# Patient Record
Sex: MALE | Race: ASIAN | NOT HISPANIC OR LATINO | Employment: UNEMPLOYED | URBAN - METROPOLITAN AREA
[De-identification: names, ages, dates, MRNs, and addresses within clinical notes are randomized per-mention and may not be internally consistent; named-entity substitution may affect disease eponyms.]

---

## 2024-01-18 ENCOUNTER — TELEPHONE (OUTPATIENT)
Dept: FAMILY MEDICINE | Facility: CLINIC | Age: 44
End: 2024-01-18
Payer: COMMERCIAL

## 2024-01-19 ENCOUNTER — HOSPITAL ENCOUNTER (OUTPATIENT)
Dept: RADIOLOGY | Facility: HOSPITAL | Age: 44
Discharge: HOME | End: 2024-01-19
Attending: INTERNAL MEDICINE
Payer: COMMERCIAL

## 2024-01-19 ENCOUNTER — OFFICE VISIT (OUTPATIENT)
Dept: FAMILY MEDICINE | Facility: CLINIC | Age: 44
End: 2024-01-19
Payer: COMMERCIAL

## 2024-01-19 ENCOUNTER — HOSPITAL ENCOUNTER (EMERGENCY)
Facility: HOSPITAL | Age: 44
Discharge: HOME | End: 2024-01-19
Attending: STUDENT IN AN ORGANIZED HEALTH CARE EDUCATION/TRAINING PROGRAM
Payer: COMMERCIAL

## 2024-01-19 VITALS
BODY MASS INDEX: 23.07 KG/M2 | DIASTOLIC BLOOD PRESSURE: 69 MMHG | WEIGHT: 147 LBS | HEART RATE: 83 BPM | TEMPERATURE: 98 F | OXYGEN SATURATION: 98 % | HEIGHT: 67 IN | SYSTOLIC BLOOD PRESSURE: 114 MMHG | RESPIRATION RATE: 16 BRPM

## 2024-01-19 VITALS
HEART RATE: 76 BPM | DIASTOLIC BLOOD PRESSURE: 82 MMHG | SYSTOLIC BLOOD PRESSURE: 110 MMHG | OXYGEN SATURATION: 99 % | WEIGHT: 147.69 LBS | BODY MASS INDEX: 23.13 KG/M2 | TEMPERATURE: 98 F

## 2024-01-19 DIAGNOSIS — F17.200 CURRENT EVERY DAY SMOKER: ICD-10-CM

## 2024-01-19 DIAGNOSIS — Z23 NEED FOR PNEUMOCOCCAL VACCINE: ICD-10-CM

## 2024-01-19 DIAGNOSIS — R51.9 CHRONIC LEFT-SIDED HEADACHE: ICD-10-CM

## 2024-01-19 DIAGNOSIS — Z00.00 ANNUAL PHYSICAL EXAM: Primary | ICD-10-CM

## 2024-01-19 DIAGNOSIS — F31.9 BIPOLAR 1 DISORDER: ICD-10-CM

## 2024-01-19 DIAGNOSIS — Z23 INFLUENZA VACCINE NEEDED: ICD-10-CM

## 2024-01-19 DIAGNOSIS — G89.29 CHRONIC LEFT-SIDED HEADACHE: ICD-10-CM

## 2024-01-19 DIAGNOSIS — G93.89 BRAIN MASS: Primary | ICD-10-CM

## 2024-01-19 DIAGNOSIS — Z86.19 HISTORY OF HEPATITIS B: ICD-10-CM

## 2024-01-19 DIAGNOSIS — Z12.11 COLON CANCER SCREENING: ICD-10-CM

## 2024-01-19 PROBLEM — B19.10 HEPATITIS B: Status: ACTIVE | Noted: 2024-01-19

## 2024-01-19 PROBLEM — B19.10 HEPATITIS B: Status: RESOLVED | Noted: 2024-01-19 | Resolved: 2024-01-19

## 2024-01-19 LAB
ALBUMIN SERPL BCP-MCNC: 4.1 G/DL (ref 3.5–5.2)
ALP SERPL-CCNC: 63 U/L (ref 55–135)
ALT SERPL W/O P-5'-P-CCNC: 13 U/L (ref 10–44)
ANION GAP SERPL CALC-SCNC: 8 MMOL/L (ref 8–16)
AST SERPL-CCNC: 13 U/L (ref 10–40)
BASOPHILS # BLD AUTO: 0.07 K/UL (ref 0–0.2)
BASOPHILS NFR BLD: 0.9 % (ref 0–1.9)
BILIRUB SERPL-MCNC: 0.5 MG/DL (ref 0.1–1)
BUN SERPL-MCNC: 9 MG/DL (ref 6–20)
CALCIUM SERPL-MCNC: 9.2 MG/DL (ref 8.7–10.5)
CHLORIDE SERPL-SCNC: 104 MMOL/L (ref 95–110)
CO2 SERPL-SCNC: 29 MMOL/L (ref 23–29)
CREAT SERPL-MCNC: 1 MG/DL (ref 0.5–1.4)
DIFFERENTIAL METHOD BLD: ABNORMAL
EOSINOPHIL # BLD AUTO: 0.7 K/UL (ref 0–0.5)
EOSINOPHIL NFR BLD: 9.3 % (ref 0–8)
ERYTHROCYTE [DISTWIDTH] IN BLOOD BY AUTOMATED COUNT: 11.5 % (ref 11.5–14.5)
EST. GFR  (NO RACE VARIABLE): >60 ML/MIN/1.73 M^2
GLUCOSE SERPL-MCNC: 81 MG/DL (ref 70–110)
HCT VFR BLD AUTO: 41.5 % (ref 40–54)
HGB BLD-MCNC: 14 G/DL (ref 14–18)
IMM GRANULOCYTES # BLD AUTO: 0.02 K/UL (ref 0–0.04)
IMM GRANULOCYTES NFR BLD AUTO: 0.3 % (ref 0–0.5)
LYMPHOCYTES # BLD AUTO: 2.3 K/UL (ref 1–4.8)
LYMPHOCYTES NFR BLD: 29.8 % (ref 18–48)
MCH RBC QN AUTO: 30.8 PG (ref 27–31)
MCHC RBC AUTO-ENTMCNC: 33.7 G/DL (ref 32–36)
MCV RBC AUTO: 91 FL (ref 82–98)
MONOCYTES # BLD AUTO: 0.7 K/UL (ref 0.3–1)
MONOCYTES NFR BLD: 9.3 % (ref 4–15)
NEUTROPHILS # BLD AUTO: 3.9 K/UL (ref 1.8–7.7)
NEUTROPHILS NFR BLD: 50.4 % (ref 38–73)
NRBC BLD-RTO: 0 /100 WBC
PLATELET # BLD AUTO: 197 K/UL (ref 150–450)
PMV BLD AUTO: 10.6 FL (ref 9.2–12.9)
POTASSIUM SERPL-SCNC: 3.7 MMOL/L (ref 3.5–5.1)
PROT SERPL-MCNC: 7.5 G/DL (ref 6–8.4)
RBC # BLD AUTO: 4.54 M/UL (ref 4.6–6.2)
SODIUM SERPL-SCNC: 141 MMOL/L (ref 136–145)
WBC # BLD AUTO: 7.66 K/UL (ref 3.9–12.7)

## 2024-01-19 PROCEDURE — A9585 GADOBUTROL INJECTION: HCPCS | Performed by: STUDENT IN AN ORGANIZED HEALTH CARE EDUCATION/TRAINING PROGRAM

## 2024-01-19 PROCEDURE — 90677 PCV20 VACCINE IM: CPT | Mod: S$GLB,,, | Performed by: INTERNAL MEDICINE

## 2024-01-19 PROCEDURE — 70450 CT HEAD/BRAIN W/O DYE: CPT | Mod: TC

## 2024-01-19 PROCEDURE — 90472 IMMUNIZATION ADMIN EACH ADD: CPT | Mod: S$GLB,,, | Performed by: INTERNAL MEDICINE

## 2024-01-19 PROCEDURE — 1160F RVW MEDS BY RX/DR IN RCRD: CPT | Mod: CPTII,S$GLB,, | Performed by: INTERNAL MEDICINE

## 2024-01-19 PROCEDURE — 99285 EMERGENCY DEPT VISIT HI MDM: CPT | Mod: 25

## 2024-01-19 PROCEDURE — 3074F SYST BP LT 130 MM HG: CPT | Mod: CPTII,S$GLB,, | Performed by: INTERNAL MEDICINE

## 2024-01-19 PROCEDURE — 90686 IIV4 VACC NO PRSV 0.5 ML IM: CPT | Mod: S$GLB,,, | Performed by: INTERNAL MEDICINE

## 2024-01-19 PROCEDURE — 25500020 PHARM REV CODE 255: Performed by: STUDENT IN AN ORGANIZED HEALTH CARE EDUCATION/TRAINING PROGRAM

## 2024-01-19 PROCEDURE — 70450 CT HEAD/BRAIN W/O DYE: CPT | Mod: 26,,, | Performed by: RADIOLOGY

## 2024-01-19 PROCEDURE — 99204 OFFICE O/P NEW MOD 45 MIN: CPT | Mod: 25,S$GLB,, | Performed by: INTERNAL MEDICINE

## 2024-01-19 PROCEDURE — 3079F DIAST BP 80-89 MM HG: CPT | Mod: CPTII,S$GLB,, | Performed by: INTERNAL MEDICINE

## 2024-01-19 PROCEDURE — 99999 PR PBB SHADOW E&M-EST. PATIENT-LVL III: CPT | Mod: PBBFAC,,, | Performed by: INTERNAL MEDICINE

## 2024-01-19 PROCEDURE — 90471 IMMUNIZATION ADMIN: CPT | Mod: S$GLB,,, | Performed by: INTERNAL MEDICINE

## 2024-01-19 PROCEDURE — 3008F BODY MASS INDEX DOCD: CPT | Mod: CPTII,S$GLB,, | Performed by: INTERNAL MEDICINE

## 2024-01-19 PROCEDURE — 1159F MED LIST DOCD IN RCRD: CPT | Mod: CPTII,S$GLB,, | Performed by: INTERNAL MEDICINE

## 2024-01-19 PROCEDURE — 80053 COMPREHEN METABOLIC PANEL: CPT | Performed by: STUDENT IN AN ORGANIZED HEALTH CARE EDUCATION/TRAINING PROGRAM

## 2024-01-19 PROCEDURE — 85025 COMPLETE CBC W/AUTO DIFF WBC: CPT | Performed by: STUDENT IN AN ORGANIZED HEALTH CARE EDUCATION/TRAINING PROGRAM

## 2024-01-19 RX ORDER — VARENICLINE TARTRATE 0.5 (11)-1
KIT ORAL
Qty: 1 EACH | Refills: 0 | Status: SHIPPED | OUTPATIENT
Start: 2024-01-19 | End: 2024-04-19

## 2024-01-19 RX ORDER — TOPIRAMATE 25 MG/1
25 TABLET ORAL DAILY
Qty: 60 TABLET | Refills: 0 | Status: SHIPPED | OUTPATIENT
Start: 2024-01-19 | End: 2024-04-15 | Stop reason: CLARIF

## 2024-01-19 RX ORDER — GADOBUTROL 604.72 MG/ML
6 INJECTION INTRAVENOUS
Status: COMPLETED | OUTPATIENT
Start: 2024-01-19 | End: 2024-01-19

## 2024-01-19 RX ORDER — OSELTAMIVIR PHOSPHATE 75 MG/1
75 CAPSULE ORAL 2 TIMES DAILY
Qty: 10 CAPSULE | Refills: 0 | Status: SHIPPED | OUTPATIENT
Start: 2024-01-19 | End: 2024-01-19 | Stop reason: SDUPTHER

## 2024-01-19 RX ORDER — OSELTAMIVIR PHOSPHATE 75 MG/1
75 CAPSULE ORAL
Status: DISCONTINUED | OUTPATIENT
Start: 2024-01-19 | End: 2024-01-19

## 2024-01-19 RX ADMIN — IOHEXOL 80 ML: 350 INJECTION, SOLUTION INTRAVENOUS at 07:01

## 2024-01-19 RX ADMIN — GADOBUTROL 6 ML: 604.72 INJECTION INTRAVENOUS at 05:01

## 2024-01-19 NOTE — Clinical Note
"Teto Haddadsebastian Chong was seen and treated in our emergency department on 1/19/2024.  He may return to work on 01/21/2024.       If you have any questions or concerns, please don't hesitate to call.      Alberto Sommer MD"

## 2024-01-19 NOTE — ED TRIAGE NOTES
Pt reports being seen at an [facility] for HA.  He had CT scan done today which was abnormal and he was told to come to ED for further evaluation.  He reports chronic HA to left side and takes advil to decrease pain.  He reports daily advil for years.  Denies medical hx.  Allergic to bees.

## 2024-01-19 NOTE — ED PROVIDER NOTES
Encounter Date: 1/19/2024       History     Chief Complaint   Patient presents with    Headache     Patient reports was having CT sent to ED secondary to abnormal reading, States having ongoing left HA without significant vision issues, denies any deficits.      43-year-old male presents with a headache that has been present for approximately 1-2 years found have an abnormal CT scan finding an outpatient Radiology.  Patient reported that he had left-sided headache throughout this time and it was his new year's resolution to have it checked out.  He saw his primary care physician who recommended a CT of the brain.  The CT scan was performed and the patient was emergently sent to the emergency department.  He denies any nausea, vomiting, dysuria, dysphagia, dysarthria, numbness he low unilateral weakness.  He denies any trauma or falls.  He says he feels had his baseline outside of the headache.      Review of patient's allergies indicates:  No Known Allergies  Past Medical History:   Diagnosis Date    Bipolar 1 disorder     Hepatitis B      History reviewed. No pertinent surgical history.  Family History   Problem Relation Age of Onset    Hypertension Mother     Stomach cancer Father      Social History     Tobacco Use    Smoking status: Every Day     Current packs/day: 0.50     Types: Cigarettes   Substance Use Topics    Alcohol use: Yes    Drug use: No     Review of Systems    Physical Exam     Initial Vitals [01/19/24 1434]   BP Pulse Resp Temp SpO2   131/69 106 18 98.2 °F (36.8 °C) 96 %      MAP       --         Physical Exam    Nursing note and vitals reviewed.  Constitutional: He appears well-developed and well-nourished.   HENT:   Head: Normocephalic and atraumatic.   Eyes: EOM are normal. Pupils are equal, round, and reactive to light.   Neck: Neck supple. No JVD present.   Normal range of motion.  Cardiovascular:  Normal rate and regular rhythm.           Pulmonary/Chest: Breath sounds normal. No stridor. No  respiratory distress.   Abdominal: Abdomen is soft. He exhibits no distension. There is no abdominal tenderness. There is no rebound and no guarding.   Musculoskeletal:         General: No tenderness or edema. Normal range of motion.      Cervical back: Normal range of motion and neck supple.     Neurological: He is alert and oriented to person, place, and time. GCS score is 15. GCS eye subscore is 4. GCS verbal subscore is 5. GCS motor subscore is 6.   Skin: Skin is warm and dry. Capillary refill takes less than 2 seconds.   Psychiatric: He has a normal mood and affect. Thought content normal.         ED Course   Procedures  Labs Reviewed   CBC W/ AUTO DIFFERENTIAL - Abnormal; Notable for the following components:       Result Value    RBC 4.54 (*)     Eos # 0.7 (*)     Eosinophil % 9.3 (*)     All other components within normal limits   COMPREHENSIVE METABOLIC PANEL          Imaging Results              CTA Brain (Final result)  Result time 01/19/24 20:36:09      Final result by Seun Bales MD (01/19/24 20:36:09)                   Impression:      No acute abnormality.    No high-grade stenosis or major vessel occlusion.    No evidence of AVM.    No abnormal enhancement in the mass lesions seen in the right occipital lobe near the occipital horn of the lateral ventricle.  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      Electronically signed by: Seun Bales  Date:    01/19/2024  Time:    20:36               Narrative:    EXAMINATION:  CTA HEAD    CLINICAL HISTORY:  possible avm;    TECHNIQUE:  Non contrast low dose axial images were obtained though the head. CT angiogram was performed from the level of the bottom of C2 to the top of the head following the IV administration of 100 mL of Omnipaque 350.   Sagittal and coronal reconstructions and maximum intensity projection reconstructions were performed.    COMPARISON:  None    FINDINGS:  Intracranial Compartment:    Ventricles and  sulci are stable in size for age without evidence of hydrocephalus. No extra-axial blood or fluid collections.    The brain parenchyma appears stable.  No parenchymal mass, hemorrhage, edema, or major vascular distribution infarct.    The high density mass seen on CT and MRI near the occipital horn of the lateral ventricle in the para sagittal right occipital cortex is again noted unchanged.    Skull/Extracranial Contents (limited evaluation): Stable    Intracranial Arteries: No focal high-grade stenosis, occlusion, or aneurysm.  Specifically, no evidence of abnormal enhancement of the mass in the right occipital lobe.  No evidence of AVM.    Venous structures (limited evaluation): Normal.    The cervical circulation and aortic arch appear unremarkable.  No common carotid or internal carotid stenosis.  Co dominant vertebral artery flow.  Visualized visceral spaces unremarkable as imaged.  Lung apices clear is visualized.                                        MRI Brain W WO Contrast (Final result)  Result time 01/19/24 18:40:55      Final result by Geronimo Ovalles MD (01/19/24 18:40:55)                   Impression:      2.4 x 1.6 cm space-occupying lesion in the right medial occipital lobe with subacute blood products and extension into the occipital horn of the right lateral ventricle.  No evidence of hydrocephalus.  No appreciable enhancement.  CTA of the brain is suggested for the evaluation of possible underlying vascular lesion.  Follow-up with neuro surgery is also suggested.    This report was flagged in Epic as abnormal.      Electronically signed by: Geronimo Ovalles MD  Date:    01/19/2024  Time:    18:40               Narrative:    EXAMINATION:  MRI BRAIN W WO CONTRAST    CLINICAL HISTORY:  brain lesion;    TECHNIQUE:  Multiplanar multisequence MR imaging of the brain was performed before and after the administration of 6 mL Gadavist intravenous contrast.    COMPARISON:  CT head dated  01/19/2024.    FINDINGS:  The craniocervical junction is intact.  The sellar and parasellar structures are within normal limits.  The intracranial flow voids are within normal limits.    There is a 1.6 x 1.4 cm focus of diffusion restriction in the occipital horn of the right lateral ventricle.    There is also focus of diffusion restriction within the posterior aspect of the previously identified mass in the right occipital lobe.  There is associated gradient echo susceptibility artifact along the periphery of the diffusion restriction in the right occipital horn.    There is also gradient echo susceptibility artifact in the lesion in the right medial occipital lobe.  The lesion measures approximately 2.4 x 1.6 cm.  The lesion demonstrates T1 hyperintensity with corresponding decreased signal on the T2 sequences.  There is a localized mass effect with no appreciable midline shift.    The remainder of the sulci are within normal limits.  There are no extra-axial fluid collections.    With regards to the right occipital lobe lesion, no appreciable enhancement is identified, allowing for the intrinsic T1 hyperintense material.    No additional abnormal enhancement identified within the brain.    The orbits and intraorbital contents are within normal limits.  The paranasal sinuses are within normal limits.  The mastoid air cells are clear.  The calvarium is intact.                                       Medications   gadobutroL (GADAVIST) injection 6 mL (6 mLs Intravenous Given 1/19/24 1733)   iohexoL (OMNIPAQUE 350) injection 80 mL (80 mLs Intravenous Given 1/19/24 1918)     Medical Decision Making  Hemodynamically stable. Afebrile. Phonating and protecting the airway spontaneously. No clinical evidence for cardiovascular instability or impending airway compromise. Examination as above. \ Prior medical records reviewed. PMD note reviewed, pt established care with a PCP for well check. Reportedly had hallucinations.  Current co-morbidities considered that will impact clinical decision making include as above.    Plan:  CBC, CMP, MRI brain.       Amount and/or Complexity of Data Reviewed  Labs: ordered.  Radiology: ordered.    Risk  Prescription drug management.               ED Course as of 01/19/24 2044 Fri Jan 19, 2024 1904 Spoke with Neurosurgery regarding MRI results.  Recommended CT angiogram of the brain to assess for vascular lesion and degree of bleeding.  If degree of bleeding is unchanged, patient can be discharged home with vascular Neurology follow-up.  If however bleeding has worsened, recommended transfer to main [place]. [BG]   2043 CTA reviewed. Will give NSGY referral.  [BG]      ED Course User Index  [BG] Alberto Sommer MD                           Clinical Impression:  Final diagnoses:  [G93.89] Brain mass (Primary)                 Alberto Sommer MD  01/19/24 2044

## 2024-01-19 NOTE — ASSESSMENT & PLAN NOTE
Chronic. Refer to HPI.     - advised patient to follow up with his previous psychiatrist. Will refer to psychiatry if he hasn't followed up with them in 3 months

## 2024-01-19 NOTE — ASSESSMENT & PLAN NOTE
About 13 pack year history    - will start chantix. Counseled about SE  - follow up in three months

## 2024-01-19 NOTE — PROGRESS NOTES
Health Maintenance Due   Topic     Hepatitis C Screening  Consult pcp    Lipid Panel  Consult pcp    COVID-19 Vaccine (1) Not offered at this office    Pneumococcal Vaccines (Age 0-64) (1 - PCV) Pt agree to get today    HIV Screening  Consult pcp    TETANUS VACCINE      Colorectal Cancer Screening  Consult pcp    Influenza Vaccine (1) Pt agree to get today

## 2024-01-19 NOTE — ASSESSMENT & PLAN NOTE
Chronic, daily, mild-mod. Likely tension?   Reports good sleep. Has stress from work     - rule out intracranial lesion with CT head  - will start topiramate 25mg daily, can increase dose   - advised patient to get vision checked

## 2024-01-19 NOTE — PROGRESS NOTES
Chief Complaint: Annual Exam (Headaches on left side of head for years and pt has been taking advil to help with pain) and Establish Care      Teto Chong  is a 43 y.o. year old patient who presents to clinic today to establish as a new patient.     Daily headaches, takes advil everyday, sometimes more than once. Left sided. Tylenol does not work. 3-4/10 right now, sometimes 7-8/10. Going on for more than a year. No weakness or loss of sensation.     Has history of bipolar 1. Was following with psych but felt drowsy from meds so stopped following up. Reports that he sometimes feels people are following him and sometimes hears voices calling his name.     Patient also has history of Hep B, reports that he was treated in the past.     Social hx: Patient does smoke cigarettes or vape. Started when 17 yo, 0.5 ppd, around 13 pack year.     History reviewed. No pertinent surgical history.     Family History   Problem Relation Age of Onset    Hypertension Mother     Stomach cancer Father         Social History     Socioeconomic History    Marital status: Single   Tobacco Use    Smoking status: Every Day     Current packs/day: 0.50     Types: Cigarettes   Substance and Sexual Activity    Alcohol use: Yes    Drug use: No         Current Outpatient Medications:     topiramate (TOPAMAX) 25 MG tablet, Take 1 tablet (25 mg total) by mouth once daily., Disp: 60 tablet, Rfl: 0    varenicline (CHANTIX STARTING MONTH BOX) 0.5 mg (11)- 1 mg (42) tablet, Take one 0.5mg tab by mouth once daily X3 days,then increase to one 0.5mg tab twice daily X4 days,then increase to one 1mg tab twice daily, Disp: 1 each, Rfl: 0     Review of Systems   Eyes:  Negative for blurred vision.   Respiratory:  Negative for cough and shortness of breath.    Cardiovascular:  Negative for chest pain.   Gastrointestinal:  Negative for abdominal pain, constipation, diarrhea and nausea.   Neurological:  Positive for headaches. Negative for dizziness,  sensory change, focal weakness, loss of consciousness and weakness.   Psychiatric/Behavioral:  Positive for hallucinations (auditory?). Negative for suicidal ideas.         Objective:      Vitals:    01/19/24 1049   BP: 110/82   Pulse: 76   Temp: 98.2 °F (36.8 °C)       Physical Exam  Vitals and nursing note reviewed.   Constitutional:       Appearance: Normal appearance.   HENT:      Head: Normocephalic and atraumatic.   Cardiovascular:      Rate and Rhythm: Normal rate and regular rhythm.   Pulmonary:      Effort: Pulmonary effort is normal.      Breath sounds: Normal breath sounds. No wheezing or rales.   Abdominal:      General: Bowel sounds are normal.      Palpations: Abdomen is soft.      Tenderness: There is no abdominal tenderness.   Musculoskeletal:      Right lower leg: No edema.      Left lower leg: No edema.   Skin:     General: Skin is warm and dry.   Neurological:      General: No focal deficit present.      Mental Status: He is alert and oriented to person, place, and time.      Sensory: No sensory deficit.      Motor: No weakness.      Gait: Gait normal.   Psychiatric:         Mood and Affect: Mood normal.         Behavior: Behavior normal.          Assessment:       1. Annual physical exam    2. Influenza vaccine needed    3. Need for pneumococcal vaccine    4. Current every day smoker    5. Chronic left-sided headache    6. Colon cancer screening    7. History of hepatitis B    8. Bipolar 1 disorder          Plan:   1. Annual physical exam  Assessment & Plan:  - cologuard   - pneumonia and flu vaccine   - annual labs   - Reviewed medical, surgical, family and social history.   - counseled on making appointment for yearly dental and eye check up       Orders:  -     CBC Auto Differential; Future; Expected date: 01/19/2024  -     Comprehensive Metabolic Panel; Future; Expected date: 01/19/2024  -     Hemoglobin A1C; Future; Expected date: 01/19/2024  -     HIV 1/2 Ag/Ab (4th Gen); Future; Expected  date: 01/19/2024  -     Hepatitis C Antibody; Future; Expected date: 01/19/2024  -     Lipid Panel; Future; Expected date: 01/19/2024    2. Influenza vaccine needed  -     Influenza - Quadrivalent *Preferred* (6 months+) (PF)    3. Need for pneumococcal vaccine  -     Pneumococcal Conjugate Vaccine (20 Valent) (IM)(Preferred)    4. Current every day smoker  Assessment & Plan:  About 13 pack year history    - will start chantix. Counseled about SE  - follow up in three months     Orders:  -     varenicline (CHANTIX STARTING MONTH BOX) 0.5 mg (11)- 1 mg (42) tablet; Take one 0.5mg tab by mouth once daily X3 days,then increase to one 0.5mg tab twice daily X4 days,then increase to one 1mg tab twice daily  Dispense: 1 each; Refill: 0    5. Chronic left-sided headache  Assessment & Plan:  Chronic, daily, mild-mod. Likely tension?   Reports good sleep. Has stress from work     - rule out intracranial lesion with CT head  - will start topiramate 25mg daily, can increase dose   - advised patient to get vision checked    Orders:  -     CT Head Without Contrast; Future; Expected date: 01/19/2024  -     topiramate (TOPAMAX) 25 MG tablet; Take 1 tablet (25 mg total) by mouth once daily.  Dispense: 60 tablet; Refill: 0    6. Colon cancer screening  -     Cologuard Screening (Multitarget Stool DNA); Future; Expected date: 01/19/2024    7. History of hepatitis B  Assessment & Plan:  Reports he was treated in the past     - hepatitis panel    Orders:  -     HEPATITIS PANEL, ACUTE; Future; Expected date: 01/19/2024    8. Bipolar 1 disorder  Assessment & Plan:  Chronic. Refer to HPI.     - advised patient to follow up with his previous psychiatrist. Will refer to psychiatry if he hasn't followed up with them in 3 months            Follow up in about 3 months (around 4/19/2024) for follow up smoking and headaches.

## 2024-01-19 NOTE — ASSESSMENT & PLAN NOTE
- cologuard   - pneumonia and flu vaccine   - annual labs   - Reviewed medical, surgical, family and social history.   - counseled on making appointment for yearly dental and eye check up

## 2024-01-19 NOTE — DISCHARGE INSTRUCTIONS

## 2024-01-22 ENCOUNTER — TELEPHONE (OUTPATIENT)
Dept: NEUROLOGY | Facility: CLINIC | Age: 44
End: 2024-01-22

## 2024-01-22 ENCOUNTER — TELEPHONE (OUTPATIENT)
Dept: NEUROSURGERY | Facility: CLINIC | Age: 44
End: 2024-01-22
Payer: COMMERCIAL

## 2024-01-22 ENCOUNTER — PATIENT MESSAGE (OUTPATIENT)
Dept: NEUROLOGY | Facility: CLINIC | Age: 44
End: 2024-01-22
Payer: COMMERCIAL

## 2024-01-22 ENCOUNTER — TELEPHONE (OUTPATIENT)
Dept: FAMILY MEDICINE | Facility: CLINIC | Age: 44
End: 2024-01-22
Payer: COMMERCIAL

## 2024-01-22 DIAGNOSIS — B18.1 CHRONIC HEPATITIS B: ICD-10-CM

## 2024-01-22 DIAGNOSIS — Z86.19 HISTORY OF HEPATITIS B: Primary | ICD-10-CM

## 2024-01-22 NOTE — TELEPHONE ENCOUNTER
----- Message from Dipika Perales RN sent at 1/22/2024  3:06 PM CST -----  Regarding: FW: Appointment  Contact: [phone]  Would this be a patient for Reny?  ED notes says vascular neurology follow up.  Looks like patient self scheduled with [facility].   ----- Message -----  From: Kimberly Camp  Sent: 1/22/2024   2:57 PM CST  To: [facility] Staff  Subject: Appointment                                      Calling to schedule an appointment per abnormal MRI as soon as possible. Please call patient to schedule today.

## 2024-01-22 NOTE — TELEPHONE ENCOUNTER
----- Message from Angie Null sent at 1/22/2024  8:32 AM CST -----  Regarding: Patient call back  .Type: Patient Call Back    Who called:self     What is the request in detail:states the test that was recommend has been completed     Can the clinic reply by [facility]?no     Would the patient rather a call back or a response via [facility]? Call     Best call back [phone] call around 12 pm noon if needed, or leave a message       Additional Information:

## 2024-01-22 NOTE — TELEPHONE ENCOUNTER
Left a message for the patient to let him know his appointment on 1/26 is scheduled incorreclty. The patient was provided a phone number to contact Neurosurgery to schedule an appointment.

## 2024-01-23 ENCOUNTER — TELEPHONE (OUTPATIENT)
Dept: FAMILY MEDICINE | Facility: CLINIC | Age: 44
End: 2024-01-23
Payer: COMMERCIAL

## 2024-01-23 NOTE — TELEPHONE ENCOUNTER
LVM advising patient to check his [facility] message. If the call is return please have pt to login for his results and to schedule ordered test.

## 2024-01-23 NOTE — TELEPHONE ENCOUNTER
----- Message from Mariah Negrete sent at 1/23/2024 12:14 PM CST -----  .Type:  Patient Returning Call    Who Called: Self     Who Left Message for Patient: Rosanne     Does the patient know what this is regarding?: Yes     Would the patient rather a call back or a response via [facility]? Call Back     Best Call Back Number: .[phone] (home)       Additional Information:

## 2024-01-23 NOTE — TELEPHONE ENCOUNTER
----- Message from Ana Rosa Lind MD sent at 1/22/2024  1:35 PM CST -----  Please call the patient and let him know that he tested positive for Hep B (chronic). He will need an ultrasound of the liver and HBV DNA level. The rest of his lab work was normal including the liver enzymes. Thank you

## 2024-02-06 ENCOUNTER — OFFICE VISIT (OUTPATIENT)
Dept: NEUROSURGERY | Facility: CLINIC | Age: 44
End: 2024-02-06
Payer: COMMERCIAL

## 2024-02-06 DIAGNOSIS — G93.89 BRAIN MASS: ICD-10-CM

## 2024-02-06 PROCEDURE — 1159F MED LIST DOCD IN RCRD: CPT | Mod: CPTII,S$GLB,, | Performed by: NEUROLOGICAL SURGERY

## 2024-02-06 PROCEDURE — 3044F HG A1C LEVEL LT 7.0%: CPT | Mod: CPTII,S$GLB,, | Performed by: NEUROLOGICAL SURGERY

## 2024-02-06 PROCEDURE — 99999 PR PBB SHADOW E&M-EST. PATIENT-LVL III: CPT | Mod: PBBFAC,,, | Performed by: NEUROLOGICAL SURGERY

## 2024-02-06 PROCEDURE — 99204 OFFICE O/P NEW MOD 45 MIN: CPT | Mod: S$GLB,,, | Performed by: NEUROLOGICAL SURGERY

## 2024-02-06 RX ORDER — IBUPROFEN 200 MG
200 TABLET ORAL EVERY 6 HOURS PRN
COMMUNITY
End: 2024-04-19

## 2024-02-06 NOTE — PROGRESS NOTES
Neurosurgery  History & Physical  SCRIBE #1 NOTE: I, JOSSELYNALLYSON SAMSON, am scribing for, and in the presence of,  Kendall Solis MD. I have scribed the entire note.        SUBJECTIVE:     Chief Complaint: Headaches    History of Present Illness:  43 y.o. male presents for evaluation of headaches. Pt reports that he initially started having constant headaches for over a year that were intermittent at first. He feels more fatigued in the afternoons. The headaches are worse in the morning but are more bearable around night time. The pain varies and does not occur every day, but he now has sharp pain episodes near his ear. Endorses taking 4-5 Ibuprofen with no relief of pain. 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 Denies any PMHx of HTN. Endorses tobacco use (half a pack of cigarettes a day).    Review of patient's allergies indicates:  No Known Allergies    Current Outpatient Medications   Medication Sig Dispense Refill    topiramate (TOPAMAX) 25 MG tablet Take 1 tablet (25 mg total) by mouth once daily. 60 tablet 0    varenicline (CHANTIX STARTING MONTH BOX) 0.5 mg (11)- 1 mg (42) tablet Take one 0.5mg tab by mouth once daily X3 days,then increase to one 0.5mg tab twice daily X4 days,then increase to one 1mg tab twice daily 1 each 0     No current facility-administered medications for this visit.       Past Medical History:   Diagnosis Date    Bipolar 1 disorder     Hepatitis B      No past surgical history on file.  Family History       Problem Relation (Age of Onset)    Hypertension Mother    Stomach cancer Father          Social History     Socioeconomic History    Marital status: Single   Tobacco Use    Smoking status: Every Day     Current packs/day: 0.50     Types: Cigarettes   Substance and Sexual Activity    Alcohol use: Yes    Drug use: No       Review of Systems   Constitutional:  Positive for fatigue.   Neurological:  Positive for headaches.   All other systems reviewed and are negative.      OBJECTIVE:     Vital  Signs     There is no height or weight on file to calculate BMI.    Physical Exam:    Constitutional: He appears well-developed and well-nourished. He is not diaphoretic. No distress.     Psych/Behavior: He is alert. He is oriented to person, place, and time. He has a normal mood and affect.     Diagnostic Results:  I have reviewed and independently interpreted the CTA Brain (01/19/2024).    FINDINGS:  Intracranial Compartment:     Ventricles and sulci are stable in size for age without evidence of hydrocephalus. No extra-axial blood or fluid collections.     The brain parenchyma appears stable.  No parenchymal mass, hemorrhage, edema, or major vascular distribution infarct.     The high density mass seen on CT and MRI near the occipital horn of the lateral ventricle in the para sagittal right occipital cortex is again noted unchanged.     Skull/Extracranial Contents (limited evaluation): Stable     Intracranial Arteries: No focal high-grade stenosis, occlusion, or aneurysm.  Specifically, no evidence of abnormal enhancement of the mass in the right occipital lobe.  No evidence of AVM.     Venous structures (limited evaluation): Normal.     The cervical circulation and aortic arch appear unremarkable.  No common carotid or internal carotid stenosis.  Co dominant vertebral artery flow.  Visualized visceral spaces unremarkable as imaged.  Lung apices clear is visualized.     Impression:     No acute abnormality.     No high-grade stenosis or major vessel occlusion.     No evidence of AVM.     No abnormal enhancement in the mass lesions seen in the right occipital lobe near the occipital horn of the lateral ventricle.  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      02) I have reviewed and independently interpreted the MRI Brain W WO Contrast (01/19/2024).  FINDINGS:  The craniocervical junction is intact.  The sellar and parasellar structures are within normal limits.  The intracranial flow voids are  within normal limits.     There is a 1.6 x 1.4 cm focus of diffusion restriction in the occipital horn of the right lateral ventricle.     There is also focus of diffusion restriction within the posterior aspect of the previously identified mass in the right occipital lobe.  There is associated gradient echo susceptibility artifact along the periphery of the diffusion restriction in the right occipital horn.     There is also gradient echo susceptibility artifact in the lesion in the right medial occipital lobe.  The lesion measures approximately 2.4 x 1.6 cm.  The lesion demonstrates T1 hyperintensity with corresponding decreased signal on the T2 sequences.  There is a localized mass effect with no appreciable midline shift.     The remainder of the sulci are within normal limits.  There are no extra-axial fluid collections.     With regards to the right occipital lobe lesion, no appreciable enhancement is identified, allowing for the intrinsic T1 hyperintense material.     No additional abnormal enhancement identified within the brain.     The orbits and intraorbital contents are within normal limits.  The paranasal sinuses are within normal limits.  The mastoid air cells are clear.  The calvarium is intact.     Impression:  2.4 x 1.6 cm space-occupying lesion in the right medial occipital lobe with subacute blood products and extension into the occipital horn of the right lateral ventricle.  No evidence of hydrocephalus.  No appreciable enhancement.  CTA of the brain is suggested for the evaluation of possible underlying vascular lesion.  Follow-up with neuro surgery is also suggested.     This report was flagged in Epic as abnormal.  ASSESSMENT/PLAN:     43-year-old Bengali male with unusual radiographic finding of subacute hemorrhage in the right parietal occipital horn in a cystic lesion in the left occipital ventricle. He has worsening headaches with uncertain etiology. Given his long Hx of smoking,  metastases is a possibility. I would like a CT chest, abdomen, and pelvis along with a repeat MR with MR perfusion in about 4-6 weeks to see if we see anything underneath the hematoma once it clears. I would also like ophthalmology to evaluate the pt for papilledema. Given symptoms and uncertain diagnoses, I think it is reasonable to keep the pt out of work until we have more clarity and prognosis.     Scribe Attestation:   Scribe #1: I performed the above scribed service and the documentation accurately describes the services I performed. I attest to the accuracy of the note.       I, TOOTIE Solis, personally performed the services described in this documentation. All medical record entries made by the scribe were at my direction and in my presence. I have reviewed the chart and agree that the record reflects my personal performance and is accurate and complete.     Note dictated with voice recognition software, please excuse any grammatical errors.

## 2024-02-08 DIAGNOSIS — G93.89 BRAIN MASS: Primary | ICD-10-CM

## 2024-02-14 ENCOUNTER — TELEPHONE (OUTPATIENT)
Dept: OPHTHALMOLOGY | Facility: CLINIC | Age: 44
End: 2024-02-14
Payer: COMMERCIAL

## 2024-02-15 ENCOUNTER — TELEPHONE (OUTPATIENT)
Dept: NEUROSURGERY | Facility: CLINIC | Age: 44
End: 2024-02-15
Payer: COMMERCIAL

## 2024-02-15 DIAGNOSIS — G93.89 BRAIN MASS: Primary | ICD-10-CM

## 2024-02-15 NOTE — TELEPHONE ENCOUNTER
Spoke to patient and confirmed times, dates, and locations for MRI and in person f/u w/ Dr. Solis per pt request                       ----- Message -----  From: Tez Wade  To: [facility] Staff    Type: orders    Who Called:pt  Does the patient know what this is regarding?:requesting information for forms he bought to appt  Pt stated he needs to get a ct scan per last visit   Would the patient rather a call back or a response via [facility]? Call   Best Call Back Number:[phone]  Additional Information:

## 2024-02-15 NOTE — TELEPHONE ENCOUNTER
Creatinine order placed          ----- Message from Jessa Medrano sent at 2/15/2024 11:52 AM CST -----  Regarding: Order: Labs prior to CT  Contact: Keshia [phone]  Caller, Keshia with [facility] is calling to speak with someone in the office. Caller states that she is needing a lab order for; BMP, CMP or Creatine; needing it input into Epic. She states they are needing this before doing the CT Chest on tomorrow, 2/16/2024. If questions please call. Thanks.

## 2024-02-16 ENCOUNTER — LAB VISIT (OUTPATIENT)
Dept: LAB | Facility: HOSPITAL | Age: 44
End: 2024-02-16
Attending: NEUROLOGICAL SURGERY
Payer: COMMERCIAL

## 2024-02-16 DIAGNOSIS — G93.89 BRAIN MASS: ICD-10-CM

## 2024-02-16 LAB
CREAT SERPL-MCNC: 0.9 MG/DL (ref 0.5–1.4)
EST. GFR  (NO RACE VARIABLE): >60 ML/MIN/1.73 M^2

## 2024-02-16 PROCEDURE — 82565 ASSAY OF CREATININE: CPT | Performed by: NEUROLOGICAL SURGERY

## 2024-02-16 PROCEDURE — 36415 COLL VENOUS BLD VENIPUNCTURE: CPT | Performed by: NEUROLOGICAL SURGERY

## 2024-02-20 ENCOUNTER — LAB VISIT (OUTPATIENT)
Dept: LAB | Facility: HOSPITAL | Age: 44
End: 2024-02-20
Attending: INTERNAL MEDICINE
Payer: COMMERCIAL

## 2024-02-20 DIAGNOSIS — B18.1 CHRONIC HEPATITIS B: ICD-10-CM

## 2024-02-20 LAB — NONINV COLON CA DNA+OCC BLD SCRN STL QL: NEGATIVE

## 2024-02-20 PROCEDURE — 87517 HEPATITIS B DNA QUANT: CPT | Performed by: INTERNAL MEDICINE

## 2024-02-20 PROCEDURE — 36415 COLL VENOUS BLD VENIPUNCTURE: CPT | Performed by: INTERNAL MEDICINE

## 2024-02-21 DIAGNOSIS — B18.1 CHRONIC HEPATITIS B: Primary | ICD-10-CM

## 2024-02-21 LAB
HEPATITIS B VIRUS DNA: ABNORMAL
HEPATITIS B VIRUS LOG (IU/ML): 3.92 LOGIU/ML
HEPATITIS B VIRUS PCR, QUANT: 8403 IU/ML

## 2024-02-22 ENCOUNTER — PATIENT MESSAGE (OUTPATIENT)
Dept: FAMILY MEDICINE | Facility: CLINIC | Age: 44
End: 2024-02-22
Payer: COMMERCIAL

## 2024-02-22 ENCOUNTER — TELEPHONE (OUTPATIENT)
Dept: FAMILY MEDICINE | Facility: CLINIC | Age: 44
End: 2024-02-22
Payer: COMMERCIAL

## 2024-02-22 ENCOUNTER — PATIENT MESSAGE (OUTPATIENT)
Dept: ADMINISTRATIVE | Facility: HOSPITAL | Age: 44
End: 2024-02-22
Payer: COMMERCIAL

## 2024-02-22 ENCOUNTER — E-CONSULT (OUTPATIENT)
Dept: HEPATOLOGY | Facility: HOSPITAL | Age: 44
End: 2024-02-22
Payer: COMMERCIAL

## 2024-02-22 DIAGNOSIS — B19.10 HEPATITIS B INFECTION WITHOUT DELTA AGENT WITHOUT HEPATIC COMA, UNSPECIFIED CHRONICITY: Primary | ICD-10-CM

## 2024-02-22 PROCEDURE — 99447 NTRPROF PH1/NTRNET/EHR 11-20: CPT | Mod: ,,, | Performed by: INTERNAL MEDICINE

## 2024-02-22 NOTE — CONSULTS
[facility] HEPATOLOGY  Response for E-Consult     Patient Name: eTto Chong  MRN: [id number]  Primary Care Provider: Ana Rosa Lind MD   Requesting Provider: Ana Rosa Lind MD  E-Consult to Hepatology Outpatient  Consult performed by: Surinder Busch MD  Consult ordered by: Ana Rosa Lind MD          Recommendation: Hepatitis B with detectable viral load    Fibro scan, Check HBeAg and HBeAB  Likely needs treatment/ oral antivirals as viral load is > 2000 IU/ml    Follow up in Hepatology clinic        Total time of Consultation: 20 minute    I did not speak to the requesting provider verbally about this.     *This eConsult is based on the clinical data available to me and is furnished without benefit of a physical examination. The eConsult will need to be interpreted in light of any clinical issues or changes in patient status not available to me at the time of filing this eConsults. Significant changes in patient condition or level of acuity should result in immediate formal consultation and reevaluation. Please alert me if you have further questions.    Thank you for this eConsult referral.     Surinder Busch MD  [facility] HEPATOLOGY

## 2024-02-22 NOTE — TELEPHONE ENCOUNTER
----- Message from Ana Rosa Lind MD sent at 2/22/2024  1:02 PM CST -----  Regarding: Call patient  Please call patient and let him know that the hepatologist recommended a referral to see them,  a scan of his liver and more blood work. I have put the orders in. This is for his active Hepatitis B. Thanks!

## 2024-02-23 ENCOUNTER — HOSPITAL ENCOUNTER (OUTPATIENT)
Dept: RADIOLOGY | Facility: HOSPITAL | Age: 44
Discharge: HOME | End: 2024-02-23
Attending: NEUROLOGICAL SURGERY
Payer: COMMERCIAL

## 2024-02-23 DIAGNOSIS — G93.89 BRAIN MASS: ICD-10-CM

## 2024-02-23 PROCEDURE — 71260 CT THORAX DX C+: CPT | Mod: 26,,, | Performed by: RADIOLOGY

## 2024-02-23 PROCEDURE — 74177 CT ABD & PELVIS W/CONTRAST: CPT | Mod: 26,,, | Performed by: RADIOLOGY

## 2024-02-23 PROCEDURE — 25500020 PHARM REV CODE 255: Performed by: NEUROLOGICAL SURGERY

## 2024-02-23 PROCEDURE — 74177 CT ABD & PELVIS W/CONTRAST: CPT | Mod: TC

## 2024-02-23 RX ADMIN — IOHEXOL 15 ML: 300 INJECTION, SOLUTION INTRAVENOUS at 08:02

## 2024-02-23 RX ADMIN — IOHEXOL 75 ML: 350 INJECTION, SOLUTION INTRAVENOUS at 08:02

## 2024-02-29 ENCOUNTER — OFFICE VISIT (OUTPATIENT)
Dept: HEPATOLOGY | Facility: CLINIC | Age: 44
End: 2024-02-29
Payer: COMMERCIAL

## 2024-02-29 ENCOUNTER — TELEPHONE (OUTPATIENT)
Dept: HEPATOLOGY | Facility: CLINIC | Age: 44
End: 2024-02-29
Payer: COMMERCIAL

## 2024-02-29 VITALS — BODY MASS INDEX: 22.77 KG/M2 | WEIGHT: 145.06 LBS | HEIGHT: 67 IN

## 2024-02-29 DIAGNOSIS — B18.1 CHRONIC VIRAL HEPATITIS B WITHOUT DELTA AGENT AND WITHOUT COMA: Primary | ICD-10-CM

## 2024-02-29 PROBLEM — B18.9 CHRONIC VIRAL HEPATITIS: Status: ACTIVE | Noted: 2024-02-29

## 2024-02-29 PROCEDURE — 3008F BODY MASS INDEX DOCD: CPT | Mod: CPTII,S$GLB,, | Performed by: NURSE PRACTITIONER

## 2024-02-29 PROCEDURE — 1160F RVW MEDS BY RX/DR IN RCRD: CPT | Mod: CPTII,S$GLB,, | Performed by: NURSE PRACTITIONER

## 2024-02-29 PROCEDURE — 1159F MED LIST DOCD IN RCRD: CPT | Mod: CPTII,S$GLB,, | Performed by: NURSE PRACTITIONER

## 2024-02-29 PROCEDURE — 3044F HG A1C LEVEL LT 7.0%: CPT | Mod: CPTII,S$GLB,, | Performed by: NURSE PRACTITIONER

## 2024-02-29 PROCEDURE — 99999 PR PBB SHADOW E&M-EST. PATIENT-LVL III: CPT | Mod: PBBFAC,,, | Performed by: NURSE PRACTITIONER

## 2024-02-29 PROCEDURE — 99203 OFFICE O/P NEW LOW 30 MIN: CPT | Mod: S$GLB,,, | Performed by: NURSE PRACTITIONER

## 2024-02-29 NOTE — TELEPHONE ENCOUNTER
"Returned pts call. Pt did not answer, left vm for pt to give the office a call back    ----- Message from Farhan Hogan sent at 2/29/2024  2:57 PM CST -----  Consult/Advisory        Name Of Caller: Self        Contact Preference?: [phone]       What is the nature of the call?:  Running about 15 mins late for today's 3 pm appt. Inquiring about still being seen today        Additional Notes:  "Thank you for all that you do for our patients"      "

## 2024-02-29 NOTE — Clinical Note
Calvin Solis: I saw him today to establish care for chronic Hep B. He may need Hep B lifelong treatment in the future but it will be based on his repeat labs in 6 months. However, I saw that you are following him for a brain mass. If you find that it is malignant and ever needs cancer treatment, he would need Hep B medication before any chemo or immunosuppressing medication to prevent significant Hep B flare. I told him so he is aware if that ever comes up. Let me know if anything changes and I can always quickly start Hep B meds if needed.

## 2024-02-29 NOTE — PATIENT INSTRUCTIONS
-- Avoid alcohol. Avoid raw seafood.   -- Hepatitis B is spread through blood and bodily fluids. Do not share  razors/toothbrushes, etc, with others   -- it is possible that Hepatitis B can cause scar tissue in the liver, which can progress to cirrhosis.   -- Hepatitis B, in some cases, has a higher risk of liver cancer (hepatocellular carcinoma), especially with active hepatitis B infection. We will perform liver cancer screening every six months with ultrasound and AFP along with a clinic visit every 6 months  -- If you develop cirrhosis, it is important that we monitor you closely, as cirrhosis can cause liver cancer, liver failure, liver transplant, death.   -- Limit acetaminophen to 2000mg daily.  -- Recommended that all 1st degree relatives, household members and sexual contacts are screened for Hepatitis B. If they are negative for Hepatitis B, they should be vaccinated against Hepatitis B to protect themselves from victoria the virus  -- It is important for all current and previous sexual partners to be tested for Hepatitis B as well as complete Hep B vaccination. For sexual partners who have not completed the Hep B vaccine series yet, barrier sexual protection is recommended to prevent spread of the virus.

## 2024-02-29 NOTE — PROGRESS NOTES
"[facility] VISIT NEW PT NOTE    REFERRING PROVIDER:  Dr. Ana Rosa Lind  PCP: Ana Rosa Lind MD     CHIEF COMPLAINT: Hep B     HPI: This is a 43 y.o. patient with PMH noted below, presenting for evaluation of chronic Hepatitis B      Diagnosed >10 years, unsure of when. Recalls he thought he was taking medication in the past but he stopped it ?, unsure    No family members with  Hep B that he is aware    Liver fibrosis  -- fibroscan with RTC    Interval HPI: Presents today alone. Has had chronic Hep B for many years  Parents were born in [place]  His mom and siblings have not been tested to his knowledge  He currently lives alone   Hep B DNA ~8000, no prior labs to compare. Pt reports no Hep B labs in the past 5 years    Labs done 1/2024 show normal transaminase levels, synthetic liver function  WNL    Lab Results   Component Value Date    ALT 13 01/19/2024    AST 13 01/19/2024    ALKPHOS 63 01/19/2024    BILITOT 0.5 01/19/2024    ALBUMIN 4.1 01/19/2024     01/19/2024       Hep C and HIV testing      HCC screening:  Abd CT done 2/2024 showed no lesions, US due 8/2024  AFP with next labs   No results found for: "AFP"    Previous EGD : no indication     Denies family history of liver disease . Denies alcohol consumption -     Immunity to Hep A  - will check with next labs            Allergy and medication list reviewed and updated     PMHX:  has a past medical history of Bipolar 1 disorder and Hepatitis B.    PSHX:  has no past surgical history on file.    FAMILY HISTORY: Updated and reviewed in EPIC    ROS:   GENERAL: +  fatigue  CARDIOVASCULAR: Denies edema  GI: Denies abdominal pain  SKIN: Denies rash, itching   NEURO: Denies confusion, memory loss, or mood changes    PHYSICAL EXAM:   In no acute distress; alert and oriented to person, place and time  VITALS: Ht 5' 7" (1.702 m)   Wt 65.8 kg (145 lb 1 oz)   BMI 22.72 kg/m²   EYES: Sclerae anicteric  GI: Soft, non-tender, non-distended. No " ascites.  EXTREMITIES:  No edema.  SKIN: Warm and dry. No jaundice. No telangectasias noted. No palmar erythema.  NEURO:  No asterixis.  PSYCH:  Thought and speech pattern appropriate. Behavior normal      EDUCATION:  See instructions discussed with patient in Instructions section of the After Visit Summary     ASSESSMENT & PLAN:  43 y.o. male with:  1. Chronic hepatitis B, diagnosed >10 years ago  -- Treatment: will need treatment if Hep B DNA >2000 on next labs. Current DNA >8000 but no prior labs to compare   -- Unsure of use of Hep B medications in the past, Pt recalls maybe he was taking meds years ago and was   -- transaminases WNL  -- synthetic liver function WNL  -- immunity to Hep A with next labs   -- risk factors for transmission : parents born in endemic country ?  -- family members or partners that need to be tested : all siblings, mom   -- Fibroscan with RTC  -- screening for Hep C and HIV negative  -- HCC screening Q 6 months with AFP and abd. U/S - both next due 8/2024  -- Hep B counseling noted above discussed. Sexual partners and family members in household need to be tested and vaccinated if negative. Must use protection for sex (Hep B)   -- Labs and US q6 months, fibroscan q year    2. Brain mass  F/u with Dr. Solis upcoming if MRI  If patient was ever found to have a malignancy and needed chemo or immunosuppressing medications , would need Hep B meds asap before starting, message sent to Dr. Solis           Follow up in about 6 months (around 8/29/2024). with US and labs before  Orders Placed This Encounter   Procedures    FibroScan Transplant Hepatology(Vibration Controlled Transient Elastography)    US Abdomen Complete    AFP Tumor Marker    CBC Without Differential    Comprehensive Metabolic Panel    Hepatitis B Surface Antigen    HEPATITIS B VIRAL DNA, QUANTITATIVE    Protime-INR    Hepatitis A antibody, IgG        Thank you for allowing me to participate in the care of Teto Mcdermott  CYDNEY Ortega    I spent a total of 30 minutes on the day of the visit.This includes face to face time and non-face to face time preparing to see the patient (eg, review of tests), obtaining and/or reviewing separately obtained history, documenting clinical information in the electronic or other health record, independently interpreting results and communicating results to the patient/family/caregiver, and coordinating care.         CC'ed note to:   Ana Rosa Lind MD

## 2024-03-05 ENCOUNTER — PATIENT MESSAGE (OUTPATIENT)
Dept: NEUROSURGERY | Facility: CLINIC | Age: 44
End: 2024-03-05
Payer: COMMERCIAL

## 2024-03-09 ENCOUNTER — HOSPITAL ENCOUNTER (OUTPATIENT)
Dept: RADIOLOGY | Facility: HOSPITAL | Age: 44
Discharge: HOME | End: 2024-03-09
Attending: NEUROLOGICAL SURGERY
Payer: COMMERCIAL

## 2024-03-09 DIAGNOSIS — G93.89 BRAIN MASS: ICD-10-CM

## 2024-03-09 PROCEDURE — 70553 MRI BRAIN STEM W/O & W/DYE: CPT | Mod: 26,,, | Performed by: RADIOLOGY

## 2024-03-09 PROCEDURE — A9585 GADOBUTROL INJECTION: HCPCS | Performed by: NEUROLOGICAL SURGERY

## 2024-03-09 PROCEDURE — 25500020 PHARM REV CODE 255: Performed by: NEUROLOGICAL SURGERY

## 2024-03-09 PROCEDURE — 70553 MRI BRAIN STEM W/O & W/DYE: CPT | Mod: TC

## 2024-03-09 RX ORDER — GADOBUTROL 604.72 MG/ML
7 INJECTION INTRAVENOUS
Status: COMPLETED | OUTPATIENT
Start: 2024-03-09 | End: 2024-03-09

## 2024-03-09 RX ADMIN — GADOBUTROL 7 ML: 604.72 INJECTION INTRAVENOUS at 09:03

## 2024-03-12 ENCOUNTER — TELEPHONE (OUTPATIENT)
Dept: OPHTHALMOLOGY | Facility: CLINIC | Age: 44
End: 2024-03-12
Payer: COMMERCIAL

## 2024-03-12 ENCOUNTER — OFFICE VISIT (OUTPATIENT)
Dept: NEUROSURGERY | Facility: CLINIC | Age: 44
End: 2024-03-12
Payer: COMMERCIAL

## 2024-03-12 ENCOUNTER — TELEPHONE (OUTPATIENT)
Dept: NEUROSURGERY | Facility: CLINIC | Age: 44
End: 2024-03-12

## 2024-03-12 ENCOUNTER — PATIENT MESSAGE (OUTPATIENT)
Dept: NEUROSURGERY | Facility: CLINIC | Age: 44
End: 2024-03-12

## 2024-03-12 DIAGNOSIS — G93.89 BRAIN MASS: Primary | ICD-10-CM

## 2024-03-12 PROCEDURE — 99214 OFFICE O/P EST MOD 30 MIN: CPT | Mod: S$GLB,,, | Performed by: NEUROLOGICAL SURGERY

## 2024-03-12 PROCEDURE — 99999 PR PBB SHADOW E&M-EST. PATIENT-LVL II: CPT | Mod: PBBFAC,,, | Performed by: NEUROLOGICAL SURGERY

## 2024-03-12 PROCEDURE — 3044F HG A1C LEVEL LT 7.0%: CPT | Mod: CPTII,S$GLB,, | Performed by: NEUROLOGICAL SURGERY

## 2024-03-12 PROCEDURE — 1159F MED LIST DOCD IN RCRD: CPT | Mod: CPTII,S$GLB,, | Performed by: NEUROLOGICAL SURGERY

## 2024-03-12 RX ORDER — ACETAMINOPHEN 325 MG/1
325 TABLET ORAL
Status: ON HOLD | COMMUNITY
End: 2024-05-11 | Stop reason: HOSPADM

## 2024-03-12 NOTE — TELEPHONE ENCOUNTER
----- Message from Nahid Rosas MA sent at 3/12/2024 11:39 AM CDT -----  Regarding: Please schedule urgent neuro-ophtho eval  Good morning,   Dr. Solis wants this patient to see neuro-ophtho asap because he has interval increase in the size of the hemorrhage around brain lesion. Dr. Solis asked if he can't get in with a doctor in the next couple weeks, can we just have him do a formal visual field test?    Thank you in advance for the help!!

## 2024-03-12 NOTE — PROGRESS NOTES
Neurosurgery  Established Patient  SCRIBE #1 NOTE: I, JOSSELYN SAMSON, am scribing for, and in the presence of,  Kendall Solis MD. I have scribed the entire note.        SUBJECTIVE:     History of Present Illness:  43 y.o. male, with a PMHx of chronic left-sided headache, who we have been following for left occipital cystic lesion. We are ruling out metastatic disease.     Today pt reports that he is still having headaches and started having chills last week. He lays in bed to cope with the headaches and dizziness. He states that he also has increased fatigue. He is unsure of any recent visual disturbances. He will not be seen by an ophthalmologist until June 2024. He stopped taking Ibuprofen and started taking Tylenol.     Review of patient's allergies indicates:   Allergen Reactions    Wasp sting [allergen ext-venom-honey bee] Swelling       Current Outpatient Medications   Medication Sig Dispense Refill    ibuprofen (ADVIL,MOTRIN) 200 MG tablet Take 200 mg by mouth every 6 (six) hours as needed for Pain.      multivitamin with minerals tablet Take 1 tablet by mouth once daily.      topiramate (TOPAMAX) 25 MG tablet Take 1 tablet (25 mg total) by mouth once daily. (Patient not taking: Reported on 2/29/2024) 60 tablet 0    varenicline (CHANTIX STARTING MONTH BOX) 0.5 mg (11)- 1 mg (42) tablet Take one 0.5mg tab by mouth once daily X3 days,then increase to one 0.5mg tab twice daily X4 days,then increase to one 1mg tab twice daily 1 each 0     No current facility-administered medications for this visit.     Past Medical History:   Diagnosis Date    Bipolar 1 disorder     Hepatitis B      No past surgical history on file.  Family History       Problem Relation (Age of Onset)    Hypertension Mother    Stomach cancer Father          Social History     Socioeconomic History    Marital status: Single   Tobacco Use    Smoking status: Every Day     Current packs/day: 0.50     Types: Cigarettes   Substance and Sexual Activity     Alcohol use: Not Currently    Drug use: No     Review of Systems   Constitutional:  Positive for chills and fatigue.   Neurological:  Positive for dizziness and headaches.   All other systems reviewed and are negative.    OBJECTIVE:     Vital Signs     There is no height or weight on file to calculate BMI.    Physical Exam:    Constitutional: He appears well-developed and well-nourished. He is not diaphoretic. No distress.     Psych/Behavior: He is alert. He is oriented to person, place, and time. He has a normal mood and affect.     Diagnostic Results:  I have reviewed and independently interpreted the MRI Brain (Tumor with Perfusion) W W/O Contrast (XPD) (03/09/2024).    FINDINGS:  Evolving blood products are identified within the right occipital parenchyma, overall more complex and prominent than the initial studies dated 01/19/2024.  Mild surrounding edema.  No midline shift or herniation.  No overt intraventricular blood products.     On postcontrast imaging, there is nodular enhancement along the anteromedial margin of the hemorrhage measuring 10 mm in transverse dimension.  A prominent venous structure is also noted extending to this region.     The remainder of the brain demonstrates no evidence of hydrocephalus acute infarct or additional focus of parenchymal signal abnormality.  Left greater than right choroid plexus cysts are again identified.     Normal arterial flow voids are preserved at the skull base.  The venous sinuses are patent.     The visualized sinuses and mastoid air cells are clear.     MR perfusion evaluation is markedly limited due to prominent hemosiderin staining.  Beyond the area of hemosiderin staining, no asymmetric signal on MTT, C BF verse EBV is identified that may perhaps be more suggestive of an underlying AVM     Impression:  Evolving but increased blood products right occipital lobe with mild surrounding edema but no midline shift.  1 cm enhancing lesion along the anteromedial  margin.  Prominent venous structure extending from this lesion.  A developmental venous anomaly and underlying cavernous malformation to be considered.  An AVM is thought less likely however not excluded.  Noncontrast MRA to evaluate for arterialized flow in this region would be helpful as clinically warranted.  Follow-up to exclude other underlying lesion recommended.    I have reviewed and independently interpreted the CT Chest Abdomen Pelvis with IV Contrast (XPD) Routine Oral Contrast (02/23/2024).    FINDINGS:  Lungs: No concerning lung nodules.  No lung consolidation.  No emphysematous lung changes.  The tracheobronchial tree is clear.     Mediastinum: No lymphadenopathy.  Heart size within normal limits.  No pericardial or pleural effusion.  Thoracic aorta normal caliber.     Abdomen: Subcentimeter hepatic hypodensity noted, too small to characterize, probable cyst.  The main portal veins are patent.  The gallbladder is unremarkable.  No biliary or pancreatic ductal dilatation.  Splenic size within normal limits.  Adrenal glands unremarkable.  No renal masses or hydronephrosis.  Abdominal aorta tapers normally.  No periaortic lymphadenopathy.     Pelvis: Enlarged prostate measuring 5.3 cm (series 3, image 162).  Bladder is unremarkable.  No inguinal or pelvic lymphadenopathy.     Bowel/mesentery: Scattered colonic diverticulum.  The terminal ileum and appendix are unremarkable.  No small bowel dilatation.  No mesenteric lymphadenopathy.     Bones: No marrow replacement process.     Impression:  No lung nodules.     Subcentimeter hepatic hypodensity, too small to characterize, probable cyst.     Enlarged prostate.    ASSESSMENT/PLAN:     Pt continues to have increasing headaches and subjective visual complaints. No formal visual field done. Metastatic workup is negative, but new MRI of brain shows interval increase in the size of the hemorrhage around the lesion. At this point, I do think pt likely needs an  excisional biopsy but would want formal angiogram as well as a formal visual field done before committing to surgery.     Scribe Attestation:   Scribe #1: I performed the above scribed service and the documentation accurately describes the services I performed. I attest to the accuracy of the note.    I, TOOTIE Solis, personally performed the services described in this documentation. All medical record entries made by the scribe were at my direction and in my presence. I have reviewed the chart and agree that the record reflects my personal performance and is accurate and complete.     Note dictated with voice recognition software, please excuse any grammatical errors.

## 2024-03-12 NOTE — TELEPHONE ENCOUNTER
Appointment for HVF/OCT  3/15/24 at the [facility] location. Kimmy will call pt. To schedule an appointment with Dr Sheridan.

## 2024-03-14 ENCOUNTER — TELEPHONE (OUTPATIENT)
Dept: NEUROSURGERY | Facility: CLINIC | Age: 44
End: 2024-03-14
Payer: COMMERCIAL

## 2024-03-14 NOTE — TELEPHONE ENCOUNTER
Called 2 phone numbers - no answer. Left VM informing of scheduled times, dates, and locations for visual fields, angio, and appointment with Dr. Solis

## 2024-03-15 ENCOUNTER — CLINICAL SUPPORT (OUTPATIENT)
Dept: OPHTHALMOLOGY | Facility: CLINIC | Age: 44
End: 2024-03-15
Payer: COMMERCIAL

## 2024-03-15 DIAGNOSIS — H53.15 VISUAL DISTORTIONS OF SHAPE AND SIZE: Primary | ICD-10-CM

## 2024-03-18 ENCOUNTER — LAB VISIT (OUTPATIENT)
Dept: LAB | Facility: HOSPITAL | Age: 44
End: 2024-03-18
Attending: NURSE PRACTITIONER
Payer: COMMERCIAL

## 2024-03-18 DIAGNOSIS — G93.89 BRAIN MASS: ICD-10-CM

## 2024-03-18 LAB
ANION GAP SERPL CALC-SCNC: 6 MMOL/L (ref 8–16)
BUN SERPL-MCNC: 12 MG/DL (ref 6–20)
CALCIUM SERPL-MCNC: 9.2 MG/DL (ref 8.7–10.5)
CHLORIDE SERPL-SCNC: 106 MMOL/L (ref 95–110)
CO2 SERPL-SCNC: 27 MMOL/L (ref 23–29)
CREAT SERPL-MCNC: 0.9 MG/DL (ref 0.5–1.4)
EST. GFR  (NO RACE VARIABLE): >60 ML/MIN/1.73 M^2
GLUCOSE SERPL-MCNC: 96 MG/DL (ref 70–110)
POTASSIUM SERPL-SCNC: 4.1 MMOL/L (ref 3.5–5.1)
SODIUM SERPL-SCNC: 139 MMOL/L (ref 136–145)

## 2024-03-18 PROCEDURE — 80048 BASIC METABOLIC PNL TOTAL CA: CPT | Performed by: NEUROLOGICAL SURGERY

## 2024-03-18 PROCEDURE — 36415 COLL VENOUS BLD VENIPUNCTURE: CPT | Performed by: NEUROLOGICAL SURGERY

## 2024-03-19 ENCOUNTER — PATIENT MESSAGE (OUTPATIENT)
Dept: NEUROSURGERY | Facility: CLINIC | Age: 44
End: 2024-03-19
Payer: COMMERCIAL

## 2024-03-20 ENCOUNTER — PATIENT MESSAGE (OUTPATIENT)
Dept: INTERVENTIONAL RADIOLOGY/VASCULAR | Facility: HOSPITAL | Age: 44
End: 2024-03-20
Payer: COMMERCIAL

## 2024-03-20 ENCOUNTER — TELEPHONE (OUTPATIENT)
Dept: NEUROSURGERY | Facility: CLINIC | Age: 44
End: 2024-03-20
Payer: COMMERCIAL

## 2024-03-20 NOTE — TELEPHONE ENCOUNTER
Received message from [facility] in [place] pt needed to speak with me. Called pt. He stated he is trying to find transportation. Will let me know if he needs to cancel.

## 2024-03-20 NOTE — NURSING
Pre-procedure call complete.  Pt instructed not to eat or drink anything after midnight the night before procedure.  Pt aware will need someone to provide transport home and monitor pt 8 hours post procedure.  No driving for 3 days after procedure.   Patient advised to take blood pressure, heart medications, with a sip of water morning of procedure.  Patient verbalized aware of which medications to take.  Do not take sleep medication (including OTC) and anxiety medication the night before procedure.  Arrival time and location given.  Expected length of stay reviewed.  Covid screening completed.  Pt verbalized understanding of all pre-procedure instructions.  Written instructions and directions sent to patient in Mychart/portal.

## 2024-03-21 ENCOUNTER — HOSPITAL ENCOUNTER (OUTPATIENT)
Dept: INTERVENTIONAL RADIOLOGY/VASCULAR | Facility: HOSPITAL | Age: 44
Discharge: HOME | End: 2024-03-21
Attending: NEUROLOGICAL SURGERY | Admitting: NEUROLOGICAL SURGERY
Payer: COMMERCIAL

## 2024-03-21 ENCOUNTER — TELEPHONE (OUTPATIENT)
Dept: NEUROSURGERY | Facility: CLINIC | Age: 44
End: 2024-03-21
Payer: COMMERCIAL

## 2024-03-21 VITALS
OXYGEN SATURATION: 100 % | RESPIRATION RATE: 18 BRPM | BODY MASS INDEX: 22.76 KG/M2 | DIASTOLIC BLOOD PRESSURE: 58 MMHG | SYSTOLIC BLOOD PRESSURE: 98 MMHG | HEIGHT: 67 IN | WEIGHT: 145 LBS | HEART RATE: 84 BPM | TEMPERATURE: 98 F

## 2024-03-21 DIAGNOSIS — G93.89 BRAIN MASS: ICD-10-CM

## 2024-03-21 PROCEDURE — 36224 PLACE CATH CAROTD ART: CPT | Mod: 50,,, | Performed by: NEUROLOGICAL SURGERY

## 2024-03-21 PROCEDURE — 36227 PLACE CATH XTRNL CAROTID: CPT | Mod: 50,,, | Performed by: NEUROLOGICAL SURGERY

## 2024-03-21 PROCEDURE — 36226 PLACE CATH VERTEBRAL ART: CPT | Mod: 51,50,, | Performed by: NEUROLOGICAL SURGERY

## 2024-03-21 PROCEDURE — 25000003 PHARM REV CODE 250

## 2024-03-21 PROCEDURE — C1760 CLOSURE DEV, VASC: HCPCS

## 2024-03-21 PROCEDURE — 36226 PLACE CATH VERTEBRAL ART: CPT | Mod: 50 | Performed by: NEUROLOGICAL SURGERY

## 2024-03-21 PROCEDURE — 36224 PLACE CATH CAROTD ART: CPT | Mod: 50 | Performed by: NEUROLOGICAL SURGERY

## 2024-03-21 PROCEDURE — 36227 PLACE CATH XTRNL CAROTID: CPT | Mod: 50 | Performed by: NEUROLOGICAL SURGERY

## 2024-03-21 PROCEDURE — 63600175 PHARM REV CODE 636 W HCPCS: Performed by: STUDENT IN AN ORGANIZED HEALTH CARE EDUCATION/TRAINING PROGRAM

## 2024-03-21 PROCEDURE — 25000003 PHARM REV CODE 250: Performed by: STUDENT IN AN ORGANIZED HEALTH CARE EDUCATION/TRAINING PROGRAM

## 2024-03-21 RX ORDER — MIDAZOLAM HYDROCHLORIDE 1 MG/ML
INJECTION, SOLUTION INTRAMUSCULAR; INTRAVENOUS
Status: COMPLETED | OUTPATIENT
Start: 2024-03-21 | End: 2024-03-21

## 2024-03-21 RX ORDER — SODIUM CHLORIDE 9 MG/ML
INJECTION, SOLUTION INTRAVENOUS CONTINUOUS
Status: DISCONTINUED | OUTPATIENT
Start: 2024-03-21 | End: 2024-03-22 | Stop reason: HOSPADM

## 2024-03-21 RX ORDER — LIDOCAINE HYDROCHLORIDE 10 MG/ML
1 INJECTION, SOLUTION EPIDURAL; INFILTRATION; INTRACAUDAL; PERINEURAL ONCE
Status: DISCONTINUED | OUTPATIENT
Start: 2024-03-21 | End: 2024-03-22 | Stop reason: HOSPADM

## 2024-03-21 RX ORDER — SODIUM CHLORIDE 9 MG/ML
INJECTION, SOLUTION INTRAVENOUS
Status: COMPLETED | OUTPATIENT
Start: 2024-03-21 | End: 2024-03-21

## 2024-03-21 RX ORDER — FENTANYL CITRATE 50 UG/ML
INJECTION, SOLUTION INTRAMUSCULAR; INTRAVENOUS
Status: COMPLETED | OUTPATIENT
Start: 2024-03-21 | End: 2024-03-21

## 2024-03-21 RX ORDER — VERAPAMIL HYDROCHLORIDE 2.5 MG/ML
INJECTION, SOLUTION INTRAVENOUS
Status: COMPLETED | OUTPATIENT
Start: 2024-03-21 | End: 2024-03-21

## 2024-03-21 RX ORDER — SODIUM CHLORIDE 0.9 % (FLUSH) 0.9 %
10 SYRINGE (ML) INJECTION
Status: DISCONTINUED | OUTPATIENT
Start: 2024-03-21 | End: 2024-03-22 | Stop reason: HOSPADM

## 2024-03-21 RX ORDER — HEPARIN SODIUM 1000 [USP'U]/ML
INJECTION, SOLUTION INTRAVENOUS; SUBCUTANEOUS
Status: COMPLETED | OUTPATIENT
Start: 2024-03-21 | End: 2024-03-21

## 2024-03-21 RX ORDER — LIDOCAINE AND PRILOCAINE 25; 25 MG/G; MG/G
CREAM TOPICAL ONCE
Status: COMPLETED | OUTPATIENT
Start: 2024-03-21 | End: 2024-03-21

## 2024-03-21 RX ADMIN — FENTANYL CITRATE 50 MCG: 50 INJECTION, SOLUTION INTRAMUSCULAR; INTRAVENOUS at 10:03

## 2024-03-21 RX ADMIN — MIDAZOLAM HYDROCHLORIDE 0.5 MG: 1 INJECTION, SOLUTION INTRAMUSCULAR; INTRAVENOUS at 10:03

## 2024-03-21 RX ADMIN — VERAPAMIL HYDROCHLORIDE 10 MG: 2.5 INJECTION, SOLUTION INTRAVENOUS at 10:03

## 2024-03-21 RX ADMIN — FENTANYL CITRATE 25 MCG: 50 INJECTION, SOLUTION INTRAMUSCULAR; INTRAVENOUS at 10:03

## 2024-03-21 RX ADMIN — FENTANYL CITRATE 25 MCG: 50 INJECTION, SOLUTION INTRAMUSCULAR; INTRAVENOUS at 11:03

## 2024-03-21 RX ADMIN — MIDAZOLAM HYDROCHLORIDE 1 MG: 1 INJECTION, SOLUTION INTRAMUSCULAR; INTRAVENOUS at 10:03

## 2024-03-21 RX ADMIN — SODIUM CHLORIDE 1000 ML: 9 INJECTION, SOLUTION INTRAVENOUS at 11:03

## 2024-03-21 RX ADMIN — LIDOCAINE AND PRILOCAINE: 25; 25 CREAM TOPICAL at 09:03

## 2024-03-21 RX ADMIN — HEPARIN SODIUM 3000 ML: 1000 INJECTION, SOLUTION INTRAVENOUS; SUBCUTANEOUS at 10:03

## 2024-03-21 RX ADMIN — HEPARIN SODIUM 2000 UNITS: 1000 INJECTION, SOLUTION INTRAVENOUS; SUBCUTANEOUS at 10:03

## 2024-03-21 NOTE — CARE UPDATE
Pt arrived to MPU 4 for recovery of a cerebral angiogram. Pt to recover for until 14: 40. Hemostasis 11:55 start releasing band at 13:55 3cc q 10mins=14:40pm.  See vs in computer for assessment.

## 2024-03-21 NOTE — PLAN OF CARE
Pt tolerated cerebral angiogram well. VSS. Dr. Welch used a TR band for radial closure. Used 12 mL of air to obtain hemostasis. Maintains good waveform on pulse oximetry that is applied to finger on the right hand. 2000 units of Heparin were used during the case. May deflate the TR band in 60 minutes in the recovery area. Pt to be transported to MPU for 2 hour sedation recovery prior to be discharge into the care of family.

## 2024-03-21 NOTE — PROCEDURES
Interventional Neuroradiology Post-Procedure Note    Pre Op Diagnosis: Right occipital lesion suspicious for AVM    Post Op Diagnosis: Normal cerebral angiogram    Procedure: Diagnostic cerebral angiogram    Procedure performed by: Reny CARLSON, Annette; Yuri CARLSON, Akbar; Marely CARLSON, Nellie    Written Informed Consent Obtained: Yes    Specimen Removed: NO    Estimated Blood Loss: Minimal    Procedure report:     A 5F slender sheath was placed into the right radial artery and a 5F Ornelas 2 catheter was advanced over 0.035 glidewire into the aortic arch.  The right vertebral artery, right ICA, right ECA, left ECA and left ICA were subselected and angiography of the brain was performed after injection into each of these vessels.     Preliminary interpretation: Normal cerebral angiogram without any evidence of aneurysm or AVM.  Please see Imaging report for full details.    A right radial artery angiogram was performed, the sheath removed and hemostasis achieved using transradial bend with closing pressure of 13cc.  No hematoma was present at the time of hemostasis.    The patient tolerated the procedure well.     Plan:  -To recovery for 2h  -Avoid checking blood pressure in right arm   -Avoid carrying heavy weights with right arm > 10 lbs x 24 hrs   -Remove groin dressing tomorrow                       Akbar Welch MD, MHA  Fellow, NeuroEndovascular Surgery, [facility] [place] [facility]  Neurologist, [facility], [place]

## 2024-03-21 NOTE — PLAN OF CARE
Patient arrived to room. PIV placed. Admit assessment completed. Plan of care discussed with patient. Answers lots of questions for patient about procedure. Very nervous but ready to proceed. Reports no invasive procedures other than colonoscopy. Pt alone and family will come latter.

## 2024-03-21 NOTE — H&P
"[facility]: [facility] [place]  Inpatient Consult Note  Interventional Neuroradiology       CC: brain lesion    SUBJECTIVE:     History of Present Illness: per chart " 43 y.o. male presents for evaluation of headaches. Pt reports that he initially started having constant headaches for over a year that were intermittent at first. He feels more fatigued in the afternoons. The headaches are worse in the morning but are more bearable around night time. The pain varies and does not occur every day, but he now has sharp pain episodes near his ear. Endorses taking 4-5 Ibuprofen with no relief of pain. 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   Current Outpatient Medications on File Prior to Encounter   Medication Sig Dispense Refill    acetaminophen (TYLENOL) 325 MG tablet Take 325 mg by mouth as needed for Pain.      ibuprofen (ADVIL,MOTRIN) 200 MG tablet Take 200 mg by mouth every 6 (six) hours as needed for Pain.      multivitamin with minerals tablet Take 1 tablet by mouth once daily.      topiramate (TOPAMAX) 25 MG tablet Take 1 tablet (25 mg total) by mouth once daily. 60 tablet 0    varenicline (CHANTIX STARTING MONTH BOX) 0.5 mg (11)- 1 mg (42) tablet Take one 0.5mg tab by mouth once daily X3 days,then increase to one 0.5mg tab twice daily X4 days,then increase to one 1mg tab twice daily 1 each 0     No current facility-administered medications on file prior to encounter.      Review of patient's allergies indicates:   Allergen Reactions    Wasp sting [allergen ext-venom-honey bee] Swelling       Family History   Problem Relation Age of Onset    Hypertension Mother     Stomach cancer Father        reports that he has been smoking cigarettes. He does not have any smokeless tobacco history on file. He reports that he does not currently use alcohol. He reports " "that he does not use drugs.     Antiplatelets/Anticoagulants: no    Review of Systems:  ROS    OBJECTIVE:     Vital Signs (Most Recent):    Vital Signs (24h Range):          Physical Exam:  Physical Exam    ASA: II  Mallampati: II    Labs:  CBC/Anemia Profile:   No results for input(s): "WBC", "HGB", "HCT", "PLT", "MCV", "RDW", "IRON", "FERRITIN", "RETIC", "FOLATE", "BXXYPHHL02", "OCCULTBLOOD" in the last 168 hours.    Invalid input(s): "IRONSATURATED"     Coags:   No results for input(s): "PT", "INR", "APTT" in the last 168 hours.     Chemistries:   Recent Labs   Lab 03/18/24  1017      K 4.1      CO2 27   BUN 12   CREATININE 0.9   CALCIUM 9.2        Imaging:   CTA brain- No acute abnormality.     No high-grade stenosis or major vessel occlusion.     No evidence of AVM.     No abnormal enhancement in the mass lesions seen in the right occipital lobe near the occipital horn of the lateral ventricle.    MRI brain- 2.4 x 1.6 cm space-occupying lesion in the right medial occipital lobe with subacute blood products and extension into the occipital horn of the right lateral ventricle.        ASSESSMENT/PLAN:     43-year-old Pakistani male with unusual radiographic finding of subacute hemorrhage in the right parietal occipital horn in a cystic lesion in the left occipital ventricle. He has worsening headaches with uncertain etiology.     - DSA to evaluate lesion. Informed consent obtained. Risk vs benefits explained.     Sedation Plan: Moderate        Imaging reviewed with Radiology staff, Dr. Jarvis.          Nellie Yap MD  Fellow, NeuroEndovascular Surgery, [facility] [place] [facility]  Board certified Vascular Neurologist  [place], [place]    "

## 2024-03-21 NOTE — PLAN OF CARE
Pt arrived to IR room 4(200) for cerebral angiogram. Pt oriented to unit and staff. Plan of care reviewed with patient, patient verbalizes understanding. Comfort measures utilized. Pt safely transferred from stretcher to procedural table. Fall risk reviewed with patient, fall risk interventions maintained. Safety strap applied, positioner pillows utilized to minimize pressure points. Blankets applied. Pt prepped and draped utilizing standard sterile technique. Patient placed on continuous monitoring, as required by sedation policy. Timeouts completed utilizing standard universal time-out, per department and facility policy. RN to remain at bedside, continuous monitoring maintained. Pt resting comfortably. Denies pain/discomfort. Will continue to monitor. See flow sheets for monitoring, medication administration, and updates.

## 2024-03-21 NOTE — CARE UPDATE
Pt fully recovered and right Wrist CDI with new dressing site cleansed with NS prior to redress. Pt states full understanding of discharge and will leave via wheelchair with family when dressed.

## 2024-03-21 NOTE — Clinical Note
Right: Groin and Wrist.   Scrubbed with Chlorohexidine.    Hair: N/A.  Skin prep dry before draping.  Prepped by: Alex Herrera, RT.

## 2024-03-21 NOTE — DISCHARGE INSTRUCTIONS
Cerebral Angiography    What happens during a cerebral angiography?    An IV (intravenous line is started in your arm. You may be given a medicine that helps you relax (sedative)  Youre given an injection to numb the site where the catheter will be inserted. This is usually in the groin area  A small puncture is made into the artery, and the catheter is inserted into the blood vessel. Using X-rays, the catheter is then carefully guided through the artery.  Contrast fluid is injected through the catheter into the artery. You may feel warmth or pressure in your Head, neck, or chest. You may be asked to hold your breath and be still during injections. When the procedure is complete, the catheter is removed and pressure is held at the groin or wrist.    What happens after cerebral angiography    Youll be taken to a recovery area.  You will probably need to lay flat for 2-4 hours    Once you are at home  Dont drive for 24 hours  Avoid walking bending, lifting, and taking stairs for 24 hours.  Avoid lifting anything over 5 pounds for 7 days  Be sure to follow any other instructions from your doctor    When should I call my health care provider?    Call your doctor if you have any of the following:    Severe headache, visual problems, new weakness, dizziness, or trouble speaking  Fever of 100.4 (38C) or higher lasting for 24 to 48 hours  Bleeding, swelling, or a large lump at the insertions site  Sharp or increasing pain at the insertion site  Leg pain, numbness, or a cold leg or foot  Any other symptoms your provider instructed you to report based on your medical condition.    Contact information:    For immediate concerns that are not emergent, you may call our interventional radiology clinic at [phone] or [phone]    ** After hours and weekends: Call the paging  at [phone] and ask for the Radiology Resident on call**

## 2024-03-27 ENCOUNTER — OFFICE VISIT (OUTPATIENT)
Dept: NEUROSURGERY | Facility: CLINIC | Age: 44
End: 2024-03-27
Payer: COMMERCIAL

## 2024-03-27 DIAGNOSIS — G93.89 BRAIN MASS: Primary | ICD-10-CM

## 2024-03-27 PROCEDURE — 99999 PR PBB SHADOW E&M-EST. PATIENT-LVL III: CPT | Mod: PBBFAC,,, | Performed by: PHYSICIAN ASSISTANT

## 2024-03-27 PROCEDURE — 1159F MED LIST DOCD IN RCRD: CPT | Mod: CPTII,S$GLB,, | Performed by: PHYSICIAN ASSISTANT

## 2024-03-27 PROCEDURE — 3044F HG A1C LEVEL LT 7.0%: CPT | Mod: CPTII,S$GLB,, | Performed by: PHYSICIAN ASSISTANT

## 2024-03-27 PROCEDURE — 99214 OFFICE O/P EST MOD 30 MIN: CPT | Mod: S$GLB,,, | Performed by: PHYSICIAN ASSISTANT

## 2024-03-27 NOTE — PROGRESS NOTES
Neurosurgery  Established Patient     SUBJECTIVE:     History of Present Illness 3/12/24:  43 y.o. male, with a PMHx of chronic left-sided headache, who we have been following for left occipital cystic lesion. We are ruling out metastatic disease.     Today pt reports that he is still having headaches and started having chills last week. He lays in bed to cope with the headaches and dizziness. He states that he also has increased fatigue. He is unsure of any recent visual disturbances. He will not be seen by an ophthalmologist until June 2024. He stopped taking Ibuprofen and started taking Tylenol.     Interval history:   Patient presents today for follow-up following cerebral angiogram.  He reports continued headaches unchanged since his last appointment.  He denies any changes in his vision or other new neurologic deficits.  Angiogram was without evidence of AV fistula, AVM or aneurysm however there is possible right occipital venous anomaly identified.    Review of patient's allergies indicates:   Allergen Reactions    Wasp sting [allergen ext-venom-honey bee] Swelling       Current Outpatient Medications   Medication Sig Dispense Refill    acetaminophen (TYLENOL) 325 MG tablet Take 325 mg by mouth as needed for Pain.      multivitamin with minerals tablet Take 1 tablet by mouth once daily.      ibuprofen (ADVIL,MOTRIN) 200 MG tablet Take 200 mg by mouth every 6 (six) hours as needed for Pain.      topiramate (TOPAMAX) 25 MG tablet Take 1 tablet (25 mg total) by mouth once daily. (Patient not taking: Reported on 3/27/2024) 60 tablet 0    varenicline (CHANTIX STARTING MONTH BOX) 0.5 mg (11)- 1 mg (42) tablet Take one 0.5mg tab by mouth once daily X3 days,then increase to one 0.5mg tab twice daily X4 days,then increase to one 1mg tab twice daily 1 each 0     No current facility-administered medications for this visit.     Past Medical History:   Diagnosis Date    Bipolar 1 disorder     Brain mass     Hepatitis B       Past Surgical History:   Procedure Laterality Date    COLONOSCOPIC SURGICAL PROCEDURE      2014     Family History       Problem Relation (Age of Onset)    Hypertension Mother    Stomach cancer Father          Social History     Socioeconomic History    Marital status: Single   Tobacco Use    Smoking status: Every Day     Current packs/day: 0.50     Types: Cigarettes   Substance and Sexual Activity    Alcohol use: Not Currently    Drug use: No     Review of Systems   Constitutional:  Positive for chills and fatigue.   Neurological:  Positive for dizziness and headaches.   All other systems reviewed and are negative.    OBJECTIVE:     Vital Signs  Pain Score: 0-No pain  There is no height or weight on file to calculate BMI.    Physical Exam:    Constitutional: He appears well-developed and well-nourished. He is not diaphoretic. No distress.     Psych/Behavior: He is alert. He is oriented to person, place, and time. He has a normal mood and affect.   General: well developed, well nourished, no distress.   Head: normocephalic, atraumatic  Neurologic: Alert and oriented. Thought content appropriate.  GCS: Motor: 6/Verbal: 5/Eyes: 4 GCS Total: 15  Mental Status: Awake, Alert, Oriented x3  Cranial nerves: face symmetric, tongue midline, CN II-XII grossly intact.   Eyes: pupils equal, round, reactive to light with accomodation, EOMI.   Sensory: intact to light touch throughout    Motor Strength:Moves all extremities spontaneously with good tone.  Full strength upper and lower extremities. No abnormal movements seen.   Pronator Drift: no drift noted  Finger-to-nose: Intact bilaterally  Gait: normal      Diagnostic Results:  IR cerebral angiogram dated 03/01/20/2024 was reviewed.  .  ASSESSMENT/PLAN:     43-year-old male with right occipital brain mass who continues to have increasing headaches and subjective visual complaints. Metastatic workup is negative, but new MRI of brain shows interval increase in the size of the  hemorrhage around the lesion.  Recent cerebral angiogram was negative for AVM, aneurysm, or AV fistula but was concerning for venous anomaly.  At this point we will plan for intracranial biopsy.  We will get him scheduled in the next few weeks and obtain a preoperative synaptive MRI as well as MRI brain stealth with fiducials for intraoperative navigation.  He would like to follow-up once more with Dr. Solis preoperatively.  I encouraged him call us with any questions or concerns prior to surgery.      Dipika Carrasquillo PA-C  Neurosurgery

## 2024-04-15 ENCOUNTER — TELEPHONE (OUTPATIENT)
Dept: PREADMISSION TESTING | Facility: HOSPITAL | Age: 44
End: 2024-04-15
Payer: COMMERCIAL

## 2024-04-15 ENCOUNTER — TELEPHONE (OUTPATIENT)
Dept: FAMILY MEDICINE | Facility: CLINIC | Age: 44
End: 2024-04-15

## 2024-04-15 DIAGNOSIS — Z01.818 PREOPERATIVE TESTING: Primary | ICD-10-CM

## 2024-04-15 NOTE — PRE-PROCEDURE INSTRUCTIONS
Patient stated had a colonoscopy and did fine with the anesthesia . This will be his first surgery.Will need medical clearance from your PCP,Dr. Ana Rosa Lind.  He has an appt on 4/19 and will try to get his clearance then. Will need poc appt and labs. Our  will call to    set up these appts.     Preop instructions given. Hold aspirin, aspirin containing products, nsaids(aleve, advil, motrin, ibuprofen, naprosyn, naproxen, voltaren, diclofenac), vitamins ( Multivitamin) and supplements one week prior to surgery.     May take Tylenol.( Also sent to [facility] portal)  Verbalizes understanding.

## 2024-04-15 NOTE — TELEPHONE ENCOUNTER
----- Message from Bettina Ornelas RN sent at 4/15/2024 10:12 AM CDT -----  Patient is schedule for craniotomy for brain biopsy on 5/2 with Dr. Solis. ( approximately 345 minutes of general anesthesia) He will need medical clearance. He has an appt with you on 4/19. Will he be able to get his clearance then? If not, please schedule a preop clearance appt.  Thanks!

## 2024-04-15 NOTE — ANESTHESIA PAT ROS NOTE
4/23/2024  Teto Chong is a 43 y.o., male.with Brain Mass and  MRI of brain showing interval increase in the size of the hemorrhage around the lesion , presents to periop center for anesthesia assessment and preop instructions.    Pre-op Assessment    I have reviewed the Patient Summary Reports.     I have reviewed the Nursing Notes. I have reviewed the NPO Status.   I have reviewed the Medications.     Review of Systems  Anesthesia Hx:  No problems with previous Anesthesia             Denies Family Hx of Anesthesia complications.    Denies Personal Hx of Anesthesia complications.                    Social:  Smoker, No Alcohol Use Every Day, 0.5 ppd    Works as Technology support, currently on short term disability.      Hematology/Oncology:    Oncology Normal    -- Denies Anemia:                                  EENT/Dental:  EENT/Dental Normal           Cardiovascular:  Exercise tolerance: good   Denies Pacemaker.  Denies Hypertension.       Denies Angina.    denies PVD no hyperlipidemia  Denies FELIZ.    Functional Capacity 4 METS                         Pulmonary:    Denies COPD.  Denies Asthma.   Denies Shortness of breath.  Denies Recent URI.  Denies Sleep Apnea.                Renal/:  Renal/ Normal                 Hepatic/GI:      Denies GERD. Liver Disease, Hepatitis, B        Liver Disease, Hepatitis, chronic, Viral Hepatitis Type B       Musculoskeletal:  Musculoskeletal Normal                Neurological:  Denies TIA.  Denies CVA. Neuromuscular Disease,  Headaches Denies Seizures.     Neuro Symptoms of blurred vision, headache, weakness, tremor, dizziness Dx of Headaches        Brain Tumor, unknown etiology     Brain mass    right occipital brain mass who continues to have increasing headaches and subjective visual complaints. Metastatic workup is negative, but new MRI of brain shows  interval increase in the size of the hemorrhage around the lesion.  Recent cerebral angiogram was negative for AVM, aneurysm, or AV fistula but was concerning for venous anomaly.  At this point we will plan for intracranial biopsy              Endocrine:  Endocrine Normal Denies Diabetes. Denies Hypothyroidism.  Denies Hyperthyroidism.       Denies Obesity / BMI > 30, Denies Morbid Obesity / BMI > 40  Dermatological:  Skin Normal    Psych:  Psychiatric History anxiety  Bipolar -stopped taking medications claiming makes him extremely drowsy.    His expressions are logical and appropriate with attempts to make light of his condition.               Physical Exam  General: Well nourished, Cooperative, Alert and Oriented    Airway:  Mouth Opening: Normal  Tongue: Normal  Neck ROM: Normal ROM    Dental:  Intact    Chest/Lungs:  Clear to auscultation, Normal Respiratory Rate    Heart:  Rate: Normal  Rhythm: Regular Rhythm  Sounds: Normal          Anesthesia Assessment: Preoperative EQUATION    Planned Procedure: Procedure(s) (LRB):  CRANIOTOMY FOR BIOPSY, WITH NEOPLASM EXCISION USING COMPUTER-ASSISTED NAVIGATION- (Right)  Requested Anesthesia Type:General  Surgeon: Kendall Solis MD  Service: Neurosurgery  Known or anticipated Date of Surgery:5/2/2024    Surgeon notes: reviewed    Electronic QUestionnaire Assessment completed via nurse interview with patient.        Triage considerations:     The patient has no apparent active cardiac condition (No unstable coronary Syndrome such as severe unstable angina or recent [<1 month] myocardial infarction, decompensated CHF, severe valvular   disease or significant arrhythmia)    Previous anesthesia records:No problems, Not available, and HAS ONLY HAD COLONOSCOPY    ** THIS WILL BE HIS FIRST SURGERY**    Last PCP note: 3-6 months ago , [facility]   Subspecialty notes: Hepatology, Neurosurgery    Other important co-morbidities: PER EPIC: Smoker and BRAIN MASS, H/O CHRONIC  HEPATITIS       Tests already available:  Available tests,  within 3 months , 3-6 months ago , within [facility] . 3/9/2024 MRI BRAIN( TUMOR PERFUSION ) W & W/O CONTRAST, 1/19/2024 MRI BRAIN W W/O CONTRAST, CT HEAD W W/O CONTRAST            Instructions given. (See in Nurse's note)    Optimization:  Anesthesia Preop Clinic Assessment  Indicated    Medical Opinion Indicated           Plan:    Testing:  CBC, CMP, PT/INR, and T&S   Pre-anesthesia  visit       Visit focus: concerns in complex and/or prolonged anesthesia     Consultation:Patient's PCP for re-evaluation     Patient  has previously scheduled Medical Appointment:4/19 DR OG, 4/23 DR AVITIA, 5/1 MRI BRAIN    Navigation: Tests Scheduled. TBD             Consults scheduled.TBD             Results will be tracked by Preop Clinic.  4/19 Medical clearance given by Dr.Pooja Og  on 4/19: Preoperative clearance  Assessment & Plan:  Patient is cleared for surgery. RCRI 0 points   Counseled patient about smoking cessation   Patient is + for Hep B and has high viral load, therefore extra caution should be taken   EKG showed normal sinus rhythm   He already has pre-op labs ordered. A1c and BP controlled  Bettina Ornelas RN BSN

## 2024-04-19 ENCOUNTER — OFFICE VISIT (OUTPATIENT)
Dept: FAMILY MEDICINE | Facility: CLINIC | Age: 44
End: 2024-04-19
Payer: COMMERCIAL

## 2024-04-19 VITALS
DIASTOLIC BLOOD PRESSURE: 80 MMHG | HEART RATE: 99 BPM | TEMPERATURE: 98 F | SYSTOLIC BLOOD PRESSURE: 110 MMHG | RESPIRATION RATE: 16 BRPM | HEIGHT: 67 IN | WEIGHT: 146.63 LBS | OXYGEN SATURATION: 96 % | BODY MASS INDEX: 23.01 KG/M2

## 2024-04-19 DIAGNOSIS — F17.200 CURRENT EVERY DAY SMOKER: ICD-10-CM

## 2024-04-19 DIAGNOSIS — G93.89 BRAIN MASS: ICD-10-CM

## 2024-04-19 DIAGNOSIS — G44.229 CHRONIC TENSION-TYPE HEADACHE, NOT INTRACTABLE: ICD-10-CM

## 2024-04-19 DIAGNOSIS — Z01.818 PREOPERATIVE CLEARANCE: Primary | ICD-10-CM

## 2024-04-19 PROBLEM — Z00.00 ANNUAL PHYSICAL EXAM: Status: RESOLVED | Noted: 2024-01-19 | Resolved: 2024-04-19

## 2024-04-19 LAB
OHS QRS DURATION: 84 MS
OHS QTC CALCULATION: 397 MS

## 2024-04-19 PROCEDURE — 99214 OFFICE O/P EST MOD 30 MIN: CPT | Mod: S$GLB,,, | Performed by: INTERNAL MEDICINE

## 2024-04-19 PROCEDURE — 3074F SYST BP LT 130 MM HG: CPT | Mod: CPTII,S$GLB,, | Performed by: INTERNAL MEDICINE

## 2024-04-19 PROCEDURE — 3008F BODY MASS INDEX DOCD: CPT | Mod: CPTII,S$GLB,, | Performed by: INTERNAL MEDICINE

## 2024-04-19 PROCEDURE — 1159F MED LIST DOCD IN RCRD: CPT | Mod: CPTII,S$GLB,, | Performed by: INTERNAL MEDICINE

## 2024-04-19 PROCEDURE — 99999 PR PBB SHADOW E&M-EST. PATIENT-LVL III: CPT | Mod: PBBFAC,,, | Performed by: INTERNAL MEDICINE

## 2024-04-19 PROCEDURE — 3079F DIAST BP 80-89 MM HG: CPT | Mod: CPTII,S$GLB,, | Performed by: INTERNAL MEDICINE

## 2024-04-19 PROCEDURE — 3044F HG A1C LEVEL LT 7.0%: CPT | Mod: CPTII,S$GLB,, | Performed by: INTERNAL MEDICINE

## 2024-04-19 PROCEDURE — 93005 ELECTROCARDIOGRAM TRACING: CPT | Mod: S$GLB,,, | Performed by: INTERNAL MEDICINE

## 2024-04-19 PROCEDURE — 1160F RVW MEDS BY RX/DR IN RCRD: CPT | Mod: CPTII,S$GLB,, | Performed by: INTERNAL MEDICINE

## 2024-04-19 PROCEDURE — 93010 ELECTROCARDIOGRAM REPORT: CPT | Mod: S$GLB,,, | Performed by: INTERNAL MEDICINE

## 2024-04-19 NOTE — PROGRESS NOTES
Chief Complaint: Follow-up      Teto Chong  is a 43 y.o. year old patient who presents today for follow up and pre-op clearance     Patient is doing well. Is nervous about his biopsy and about the payments for all his procedures. Reports that he doesn't make much but is not a candidate for financial aid. He was unable to pay for Chantix.   Reports his headaches are better with tylenol.     Past Medical History:   Diagnosis Date    Bipolar 1 disorder     Brain mass     Hepatitis B        Past Surgical History:   Procedure Laterality Date    COLONOSCOPIC SURGICAL PROCEDURE      2014        Family History   Problem Relation Name Age of Onset    Hypertension Mother      Stomach cancer Father          Social History     Socioeconomic History    Marital status: Single   Tobacco Use    Smoking status: Every Day     Current packs/day: 0.50     Types: Cigarettes   Substance and Sexual Activity    Alcohol use: Not Currently    Drug use: No         Current Outpatient Medications:     acetaminophen (TYLENOL) 325 MG tablet, Take 325 mg by mouth as needed for Pain., Disp: , Rfl:      Review of Systems   HENT:  Negative for congestion.    Eyes:  Negative for blurred vision.   Respiratory:  Negative for cough and shortness of breath.    Cardiovascular:  Negative for chest pain and leg swelling.   Gastrointestinal:  Negative for abdominal pain, blood in stool, constipation, diarrhea, melena and nausea.   Genitourinary:  Negative for dysuria.   Musculoskeletal:  Negative for joint pain.   Neurological:  Positive for headaches.   Psychiatric/Behavioral:  Negative for depression. The patient is not nervous/anxious.         Objective:      Vitals:    04/19/24 1144   BP: 110/80   Pulse: 99   Resp: 16   Temp: 98 °F (36.7 °C)       Physical Exam  Vitals and nursing note reviewed.   Constitutional:       Appearance: Normal appearance.   HENT:      Head: Normocephalic and atraumatic.   Cardiovascular:      Rate and Rhythm: Normal  rate and regular rhythm.   Pulmonary:      Effort: Pulmonary effort is normal.      Breath sounds: Normal breath sounds. No wheezing or rales.   Abdominal:      General: Bowel sounds are normal.      Palpations: Abdomen is soft.      Tenderness: There is no abdominal tenderness.   Musculoskeletal:      Right lower leg: No edema.      Left lower leg: No edema.   Skin:     General: Skin is warm and dry.   Neurological:      General: No focal deficit present.      Mental Status: He is alert and oriented to person, place, and time.   Psychiatric:         Mood and Affect: Mood normal.         Behavior: Behavior normal.          Assessment:       1. Preoperative clearance    2. Current every day smoker    3. Chronic tension-type headache, not intractable    4. Right occipital brain mass          Plan:   1. Preoperative clearance  Assessment & Plan:  Patient is cleared for surgery. RCRI 0 points   Counseled patient about smoking cessation   Patient is + for Hep B and has high viral load, therefore extra caution should be taken   EKG showed normal sinus rhythm   He already has pre-op labs ordered. A1c and BP controlled    Orders:  -     IN OFFICE EKG 12-LEAD (to Muse)    2. Current every day smoker  Overview:  Chantix not covered by insurance  Does not want to join smoking cessation counseling     Assessment & Plan:  Counseled patient on importance of quitting smoking prior to surgery   He agreed to switching to e-cig without nicotine       3. Chronic tension-type headache, not intractable  Assessment & Plan:  Chronic, stable     - continue tylenol PRN      4. Right occipital brain mass  Assessment & Plan:  Planned for brain biopsy on 5/2/24. Here for pre-op  Is following with neurosurgery              Follow up in about 6 months (around 10/19/2024) for f/up.

## 2024-04-19 NOTE — ASSESSMENT & PLAN NOTE
Counseled patient on importance of quitting smoking prior to surgery   He agreed to switching to e-cig without nicotine

## 2024-04-19 NOTE — ASSESSMENT & PLAN NOTE
Patient is cleared for surgery. RCRI 0 points   Counseled patient about smoking cessation   Patient is + for Hep B and has high viral load, therefore extra caution should be taken   EKG showed normal sinus rhythm   He already has pre-op labs ordered. A1c and BP controlled

## 2024-04-22 NOTE — DISCHARGE INSTRUCTIONS
Your surgery has been scheduled for:__________5/2/2024________________________________    You should report to:  ____[facility], located on the [place] side of the first floor of the           [facility] ([phone])  _x___[facility], located on the [facility] side of the            Second floor of the [facility] [phone])  ____3rd Floor [facility] located on the [facility] side of the [facility] [phone]  ____[facility]: [facility] [address] [facility] [place] [address]. [address]     Please Note   Tell your doctor if you take Aspirin, products containing Aspirin, herbal medications  or blood thinners, such as Coumadin, Ticlid, or Plavix.  (Consult your provider regarding holding or stopping before surgery).  Arrange for someone to drive you home following surgery.  You will not be allowed to leave the surgical facility alone or drive yourself home following sedation and anesthesia.        Before Surgery  Stop taking all herbal medications, vitamins, and supplements 7 days prior to surgery  No Motrin/Advil (Ibuprofen) 7 days before surgery  No Aleve (Naproxen) 7 days before surgery  Stop Taking Asprin, products containing Asprin _7____days before surgery  Stop taking blood thinners_______days before surgery  No Goody's/BC  Powder 7 days before surgery  Refrain from drinking alcoholic beverages for 24hours before and after surgery  Stop or limit smoking _____7____days before surgery  You may take Tylenol for pain    Night before Surgery  Do not eat or drink after midnight  Take a shower or bath (shower is recommended).  Bathe with Hibiclens soap or an antibacterial soap from the neck down.  If not supplied by your surgeon, hibiclens soap will need to be purchased over the counter in pharmacy.  Rinse soap off thoroughly.  Shampoo your hair with your regular shampoo    The Day of  Surgery  Take another bath or shower with hibiclens or any antibacterial soap, to reduce the chance of infection.  Take heart and blood pressure medications with a small sip of water, as advised by the perioperative team.  Do not take fluid pills  You may brush your teeth and rinse your mouth, but do not swall any additional water.   Do not apply perfumes, powder, body lotions or deodorant on the day of surgery.  Nail polish should be removed.  Do not wear makeup or moisturizer  Wear comfortable clothes, such as a button front shirt and loose fitting pants.  Leave all jewelry, including body piercings, and valuables at home.    Bring any devices you will neeed after surgery such as crutches or canes.  If you have sleep apnea, please bring your CPAP machine  In the event that your physical condition changes including the onset of a cold or respiratory illness, or if you have to delay or cancel your surgery, please notify your surgeon.       Anesthesia: General Anesthesia     You are watched continuously during your procedure by your anesthesia provider.       Youre due to have surgery. During surgery, youll be given medicine called anesthesia or anesthetic. This will keep you comfortable and pain-free. Your anesthesia provider will use general anesthesia.  What is general anesthesia?  General anesthesia puts you into a state like deep sleep. It goes into the bloodstream (IV anesthetics), into the lungs (gas anesthetics), or both. You feel nothing during the procedure. You will not remember it. During the procedure, the anesthesia provider monitors you continuously. He or she checks your heart rate and rhythm, blood pressure, breathing, and blood oxygen.  IV anesthetics. IV anesthetics are given through an IV line in your arm. Theyre often given first. This is so you are asleep before a gas anesthetic is started. Some kinds of IV anesthetics relieve pain. Others relax you. Your doctor will decide which kind is best  in your case.  Gas anesthetics. Gas anesthetics are breathed into the lungs. They are often used to keep you asleep. They can be given through a facemask or a tube placed in your larynx or trachea (breathing tube).  If you have a facemask, your anesthesia provider will most likely place it over your nose and mouth while youre still awake. Youll breathe oxygen through the mask as your IV anesthetic is started. Gas anesthetic may be added through the mask.  If you have a tube in the larynx or trachea, it will be inserted into your throat after youre asleep.  Anesthesia tools and medicines  You will likely have:  IV anesthetics. These are put into an IV line into your bloodstream.  Gas anesthetics. You breathe these anesthetics into your lungs, where they pass into your bloodstream.  Pulse oximeter. This is a small clip that is attached to the end of your finger. This measures your blood oxygen level.  Electrocardiography leads (electrodes). These are small sticky pads that are placed on your chest. They record your heart rate and rhythm.  Blood pressure cuff. This reads your blood pressure.  Risks and possible complications  General anesthesia has some risks. These include:  Breathing problems  Nausea and vomiting  Sore throat or hoarseness (usually temporary)  Allergic reaction to the anesthetic  Irregular heartbeat (rare)  Cardiac arrest (rare)   Anesthesia safety  Follow all instructions you are given for how long not to eat or drink before your procedure.  Be sure your doctor knows what medicines and drugs you take. This includes over-the-counter medicines, herbs, supplements, alcohol or other drugs. You will be asked when those were last taken.  Have an adult family member or friend drive you home after the procedure.  For the first 24 hours after your surgery:  Do not drive or use heavy equipment.  Do not make important decisions or sign legal documents. If important decisions or signing legal documents is  necessary during the first 24 hours after surgery, have a trusted family member or spouse act on your behalf.  Avoid alcohol.  Have a responsible adult stay with you. He or she can watch for problems and help keep you safe.  Date Last Reviewed: 12/1/2016 © 2000-2017 [employer]. [address] [place] [address], [place] [address]. All rights reserved. This information is not intended as a substitute for professional medical care. Always follow your healthcare professional's instructions.

## 2024-04-23 ENCOUNTER — OFFICE VISIT (OUTPATIENT)
Dept: NEUROSURGERY | Facility: CLINIC | Age: 44
End: 2024-04-23
Payer: COMMERCIAL

## 2024-04-23 ENCOUNTER — HOSPITAL ENCOUNTER (OUTPATIENT)
Dept: PREADMISSION TESTING | Facility: HOSPITAL | Age: 44
Discharge: HOME | End: 2024-04-23
Attending: NEUROLOGICAL SURGERY
Payer: COMMERCIAL

## 2024-04-23 VITALS
BODY MASS INDEX: 22.94 KG/M2 | DIASTOLIC BLOOD PRESSURE: 75 MMHG | RESPIRATION RATE: 16 BRPM | HEART RATE: 101 BPM | WEIGHT: 146.19 LBS | SYSTOLIC BLOOD PRESSURE: 115 MMHG | TEMPERATURE: 99 F | HEIGHT: 67 IN

## 2024-04-23 DIAGNOSIS — G93.89 BRAIN MASS: Primary | ICD-10-CM

## 2024-04-23 PROCEDURE — 99999 PR PBB SHADOW E&M-EST. PATIENT-LVL II: CPT | Mod: PBBFAC,,, | Performed by: NEUROLOGICAL SURGERY

## 2024-04-23 PROCEDURE — 1159F MED LIST DOCD IN RCRD: CPT | Mod: CPTII,S$GLB,, | Performed by: NEUROLOGICAL SURGERY

## 2024-04-23 PROCEDURE — 99214 OFFICE O/P EST MOD 30 MIN: CPT | Mod: S$GLB,,, | Performed by: NEUROLOGICAL SURGERY

## 2024-04-23 PROCEDURE — 3044F HG A1C LEVEL LT 7.0%: CPT | Mod: CPTII,S$GLB,, | Performed by: NEUROLOGICAL SURGERY

## 2024-04-23 NOTE — H&P (VIEW-ONLY)
Neurosurgery  Established Patient    SCRIBE #1 NOTE: IJOSSELYN, am scribing for, and in the presence of,  Kendall Solis MD. I have scribed the entire note.        SUBJECTIVE:     History of Present Illness:  43 y.o. male, with a PMHx of chronic left-sided headache and left occipital cystic lesion, presents for f/u after last evaluation on 03/27/2024.     Today pt reports that he is still having headaches.     Review of patient's allergies indicates:   Allergen Reactions    Wasp sting [allergen ext-venom-honey bee] Swelling     Current Outpatient Medications   Medication Sig Dispense Refill    acetaminophen (TYLENOL) 325 MG tablet Take 325 mg by mouth as needed for Pain.       No current facility-administered medications for this visit.     Past Medical History:   Diagnosis Date    Bipolar 1 disorder     Brain mass     Hepatitis B      Past Surgical History:   Procedure Laterality Date    COLONOSCOPIC SURGICAL PROCEDURE      2014     Family History       Problem Relation (Age of Onset)    Hypertension Mother    Stomach cancer Father          Social History     Socioeconomic History    Marital status: Single   Tobacco Use    Smoking status: Every Day     Current packs/day: 0.50     Types: Cigarettes   Substance and Sexual Activity    Alcohol use: Not Currently    Drug use: No     Review of Systems   Neurological:  Positive for headaches.   All other systems reviewed and are negative.    OBJECTIVE:     Vital Signs     There is no height or weight on file to calculate BMI.  Physical Exam:    Constitutional: He appears well-developed and well-nourished. He is not diaphoretic. No distress.     Psych/Behavior: He is alert. He is oriented to person, place, and time. He has a normal mood and affect.     Diagnostic Results:    01) I have reviewed and independently interpreted the MRIS    02) I have reviewed and independently interpreted the Angio    03) I have reviewed and independently interpreted the  CT's    ASSESSMENT/PLAN:     Pt with known hemorrhagic abnormally enhancing lesion in the right parietal occipital lobe. Angiogram was negative for AVM, but there is abnormal enhancement in anterior medial margin concerning for a tumor. CT chest abdomen and pelvis has been negative. Pt still has headaches. I feel that pt would benefit from an excisional biopsy. We had an in-depth discussion with pt about pros and cons and answered his questions. We will plan to proceed. Pt will need navigation scan pre-op.     I have discussed the risks/benefits, indications, and alternatives for the proposed procedure in detail. I have answered all of their questions and patient wish to proceed with surgery. We will schedule patient.       Scribe Attestation:   Scribe #1: I performed the above scribed service and the documentation accurately describes the services I performed. I attest to the accuracy of the note.    I, TOOTIE Solis, personally performed the services described in this documentation. All medical record entries made by the scribe were at my direction and in my presence. I have reviewed the chart and agree that the record reflects my personal performance and is accurate and complete.     Note dictated with voice recognition software, please excuse any grammatical errors.

## 2024-04-25 ENCOUNTER — PATIENT MESSAGE (OUTPATIENT)
Dept: NEUROSURGERY | Facility: CLINIC | Age: 44
End: 2024-04-25
Payer: COMMERCIAL

## 2024-04-29 ENCOUNTER — TELEPHONE (OUTPATIENT)
Dept: NEUROSURGERY | Facility: CLINIC | Age: 44
End: 2024-04-29
Payer: COMMERCIAL

## 2024-04-29 NOTE — TELEPHONE ENCOUNTER
Called and spoke to pt.  We confirmed r/s surgery date to 5/9/24.  We also confirmed r/s times and date for clinic appt for fiducial placement and Pre-op MRI scans

## 2024-05-07 ENCOUNTER — PATIENT MESSAGE (OUTPATIENT)
Dept: NEUROSURGERY | Facility: CLINIC | Age: 44
End: 2024-05-07
Payer: COMMERCIAL

## 2024-05-08 ENCOUNTER — HOSPITAL ENCOUNTER (OUTPATIENT)
Dept: RADIOLOGY | Facility: HOSPITAL | Age: 44
Discharge: HOME | DRG: 025 | End: 2024-05-08
Attending: PHYSICIAN ASSISTANT
Payer: COMMERCIAL

## 2024-05-08 DIAGNOSIS — G93.89 BRAIN MASS: ICD-10-CM

## 2024-05-08 PROCEDURE — A9585 GADOBUTROL INJECTION: HCPCS | Performed by: PHYSICIAN ASSISTANT

## 2024-05-08 PROCEDURE — 70553 MRI BRAIN STEM W/O & W/DYE: CPT | Mod: TC

## 2024-05-08 PROCEDURE — 25500020 PHARM REV CODE 255: Performed by: PHYSICIAN ASSISTANT

## 2024-05-08 PROCEDURE — 70553 MRI BRAIN STEM W/O & W/DYE: CPT | Mod: 26,,, | Performed by: STUDENT IN AN ORGANIZED HEALTH CARE EDUCATION/TRAINING PROGRAM

## 2024-05-08 RX ORDER — GADOBUTROL 604.72 MG/ML
7 INJECTION INTRAVENOUS
Status: COMPLETED | OUTPATIENT
Start: 2024-05-08 | End: 2024-05-08

## 2024-05-08 RX ADMIN — GADOBUTROL 7 ML: 604.72 INJECTION INTRAVENOUS at 06:05

## 2024-05-09 ENCOUNTER — ANESTHESIA (OUTPATIENT)
Dept: SURGERY | Facility: HOSPITAL | Age: 44
DRG: 025 | End: 2024-05-09
Payer: COMMERCIAL

## 2024-05-09 ENCOUNTER — HOSPITAL ENCOUNTER (INPATIENT)
Facility: HOSPITAL | Age: 44
LOS: 2 days | Discharge: HOME | DRG: 025 | End: 2024-05-11
Attending: NEUROLOGICAL SURGERY | Admitting: NEUROLOGICAL SURGERY
Payer: COMMERCIAL

## 2024-05-09 ENCOUNTER — ANESTHESIA EVENT (OUTPATIENT)
Dept: SURGERY | Facility: HOSPITAL | Age: 44
DRG: 025 | End: 2024-05-09
Payer: COMMERCIAL

## 2024-05-09 DIAGNOSIS — G93.89 BRAIN MASS: ICD-10-CM

## 2024-05-09 DIAGNOSIS — D49.6 BRAIN TUMOR: ICD-10-CM

## 2024-05-09 LAB
ABO + RH BLD: NORMAL
ALBUMIN SERPL BCP-MCNC: 3.7 G/DL (ref 3.5–5.2)
ALP SERPL-CCNC: 55 U/L (ref 55–135)
ALT SERPL W/O P-5'-P-CCNC: 36 U/L (ref 10–44)
ANION GAP SERPL CALC-SCNC: 10 MMOL/L (ref 8–16)
APTT PPP: 31.9 SEC (ref 21–32)
AST SERPL-CCNC: 22 U/L (ref 10–40)
BASOPHILS # BLD AUTO: 0.02 K/UL (ref 0–0.2)
BASOPHILS # BLD AUTO: 0.07 K/UL (ref 0–0.2)
BASOPHILS NFR BLD: 0.2 % (ref 0–1.9)
BASOPHILS NFR BLD: 1.1 % (ref 0–1.9)
BILIRUB SERPL-MCNC: 0.3 MG/DL (ref 0.1–1)
BILIRUB UR QL STRIP: NEGATIVE
BLD GP AB SCN CELLS X3 SERPL QL: NORMAL
BUN SERPL-MCNC: 10 MG/DL (ref 6–20)
CALCIUM SERPL-MCNC: 8.9 MG/DL (ref 8.7–10.5)
CHLORIDE SERPL-SCNC: 106 MMOL/L (ref 95–110)
CLARITY UR REFRACT.AUTO: CLEAR
CO2 SERPL-SCNC: 22 MMOL/L (ref 23–29)
COLOR UR AUTO: COLORLESS
CREAT SERPL-MCNC: 0.9 MG/DL (ref 0.5–1.4)
DIFFERENTIAL METHOD BLD: ABNORMAL
DIFFERENTIAL METHOD BLD: ABNORMAL
EOSINOPHIL # BLD AUTO: 0 K/UL (ref 0–0.5)
EOSINOPHIL # BLD AUTO: 0.7 K/UL (ref 0–0.5)
EOSINOPHIL NFR BLD: 0 % (ref 0–8)
EOSINOPHIL NFR BLD: 10.5 % (ref 0–8)
ERYTHROCYTE [DISTWIDTH] IN BLOOD BY AUTOMATED COUNT: 11.9 % (ref 11.5–14.5)
ERYTHROCYTE [DISTWIDTH] IN BLOOD BY AUTOMATED COUNT: 16.5 % (ref 11.5–14.5)
EST. GFR  (NO RACE VARIABLE): >60 ML/MIN/1.73 M^2
GLUCOSE SERPL-MCNC: 164 MG/DL (ref 70–110)
GLUCOSE UR QL STRIP: ABNORMAL
HCT VFR BLD AUTO: 38.8 % (ref 40–54)
HCT VFR BLD AUTO: 40.8 % (ref 40–54)
HGB BLD-MCNC: 13.1 G/DL (ref 14–18)
HGB BLD-MCNC: 13.1 G/DL (ref 14–18)
HGB UR QL STRIP: ABNORMAL
IMM GRANULOCYTES # BLD AUTO: 0.01 K/UL (ref 0–0.04)
IMM GRANULOCYTES # BLD AUTO: 0.06 K/UL (ref 0–0.04)
IMM GRANULOCYTES NFR BLD AUTO: 0.2 % (ref 0–0.5)
IMM GRANULOCYTES NFR BLD AUTO: 0.5 % (ref 0–0.5)
INR PPP: 1 (ref 0.8–1.2)
KETONES UR QL STRIP: ABNORMAL
LEUKOCYTE ESTERASE UR QL STRIP: NEGATIVE
LYMPHOCYTES # BLD AUTO: 0.4 K/UL (ref 1–4.8)
LYMPHOCYTES # BLD AUTO: 1.8 K/UL (ref 1–4.8)
LYMPHOCYTES NFR BLD: 27.3 % (ref 18–48)
LYMPHOCYTES NFR BLD: 3.8 % (ref 18–48)
MCH RBC QN AUTO: 32.1 PG (ref 27–31)
MCH RBC QN AUTO: 32.1 PG (ref 27–31)
MCHC RBC AUTO-ENTMCNC: 32.1 G/DL (ref 32–36)
MCHC RBC AUTO-ENTMCNC: 33.8 G/DL (ref 32–36)
MCV RBC AUTO: 100 FL (ref 82–98)
MCV RBC AUTO: 95 FL (ref 82–98)
MONOCYTES # BLD AUTO: 0.2 K/UL (ref 0.3–1)
MONOCYTES # BLD AUTO: 0.6 K/UL (ref 0.3–1)
MONOCYTES NFR BLD: 1.9 % (ref 4–15)
MONOCYTES NFR BLD: 8.9 % (ref 4–15)
NEUTROPHILS # BLD AUTO: 10.9 K/UL (ref 1.8–7.7)
NEUTROPHILS # BLD AUTO: 3.4 K/UL (ref 1.8–7.7)
NEUTROPHILS NFR BLD: 52 % (ref 38–73)
NEUTROPHILS NFR BLD: 93.6 % (ref 38–73)
NITRITE UR QL STRIP: NEGATIVE
NRBC BLD-RTO: 0 /100 WBC
NRBC BLD-RTO: 0 /100 WBC
PH UR STRIP: 6 [PH] (ref 5–8)
PLATELET # BLD AUTO: 229 K/UL (ref 150–450)
PLATELET # BLD AUTO: 234 K/UL (ref 150–450)
PMV BLD AUTO: 10.3 FL (ref 9.2–12.9)
PMV BLD AUTO: 11.6 FL (ref 9.2–12.9)
POTASSIUM SERPL-SCNC: 4.1 MMOL/L (ref 3.5–5.1)
PROT SERPL-MCNC: 7.1 G/DL (ref 6–8.4)
PROT UR QL STRIP: NEGATIVE
PROTHROMBIN TIME: 10.9 SEC (ref 9–12.5)
RBC # BLD AUTO: 4.08 M/UL (ref 4.6–6.2)
RBC # BLD AUTO: 4.08 M/UL (ref 4.6–6.2)
SODIUM SERPL-SCNC: 138 MMOL/L (ref 136–145)
SP GR UR STRIP: 1.01 (ref 1–1.03)
SPECIMEN OUTDATE: NORMAL
URN SPEC COLLECT METH UR: ABNORMAL
WBC # BLD AUTO: 11.64 K/UL (ref 3.9–12.7)
WBC # BLD AUTO: 6.49 K/UL (ref 3.9–12.7)

## 2024-05-09 PROCEDURE — C1713 ANCHOR/SCREW BN/BN,TIS/BN: HCPCS | Performed by: NEUROLOGICAL SURGERY

## 2024-05-09 PROCEDURE — 69990 MICROSURGERY ADD-ON: CPT | Mod: 59,,, | Performed by: NEUROLOGICAL SURGERY

## 2024-05-09 PROCEDURE — 88307 TISSUE EXAM BY PATHOLOGIST: CPT | Mod: 26,,, | Performed by: STUDENT IN AN ORGANIZED HEALTH CARE EDUCATION/TRAINING PROGRAM

## 2024-05-09 PROCEDURE — 25000003 PHARM REV CODE 250: Performed by: NURSE ANESTHETIST, CERTIFIED REGISTERED

## 2024-05-09 PROCEDURE — 36000712 HC OR TIME LEV V 1ST 15 MIN: Performed by: NEUROLOGICAL SURGERY

## 2024-05-09 PROCEDURE — 63600175 PHARM REV CODE 636 W HCPCS: Performed by: NEUROLOGICAL SURGERY

## 2024-05-09 PROCEDURE — 86850 RBC ANTIBODY SCREEN: CPT | Performed by: STUDENT IN AN ORGANIZED HEALTH CARE EDUCATION/TRAINING PROGRAM

## 2024-05-09 PROCEDURE — 27201037 HC PRESSURE MONITORING SET UP

## 2024-05-09 PROCEDURE — 86920 COMPATIBILITY TEST SPIN: CPT | Performed by: NEUROLOGICAL SURGERY

## 2024-05-09 PROCEDURE — 88342 IMHCHEM/IMCYTCHM 1ST ANTB: CPT | Mod: 26,,, | Performed by: STUDENT IN AN ORGANIZED HEALTH CARE EDUCATION/TRAINING PROGRAM

## 2024-05-09 PROCEDURE — 88342 IMHCHEM/IMCYTCHM 1ST ANTB: CPT | Performed by: STUDENT IN AN ORGANIZED HEALTH CARE EDUCATION/TRAINING PROGRAM

## 2024-05-09 PROCEDURE — 25000003 PHARM REV CODE 250: Performed by: STUDENT IN AN ORGANIZED HEALTH CARE EDUCATION/TRAINING PROGRAM

## 2024-05-09 PROCEDURE — 37000009 HC ANESTHESIA EA ADD 15 MINS: Performed by: NEUROLOGICAL SURGERY

## 2024-05-09 PROCEDURE — 63600175 PHARM REV CODE 636 W HCPCS: Performed by: STUDENT IN AN ORGANIZED HEALTH CARE EDUCATION/TRAINING PROGRAM

## 2024-05-09 PROCEDURE — 63600175 PHARM REV CODE 636 W HCPCS: Performed by: NURSE ANESTHETIST, CERTIFIED REGISTERED

## 2024-05-09 PROCEDURE — 25000003 PHARM REV CODE 250

## 2024-05-09 PROCEDURE — 80053 COMPREHEN METABOLIC PANEL: CPT

## 2024-05-09 PROCEDURE — 88313 SPECIAL STAINS GROUP 2: CPT | Mod: 26,,, | Performed by: STUDENT IN AN ORGANIZED HEALTH CARE EDUCATION/TRAINING PROGRAM

## 2024-05-09 PROCEDURE — 61781 SCAN PROC CRANIAL INTRA: CPT | Mod: ,,, | Performed by: NEUROLOGICAL SURGERY

## 2024-05-09 PROCEDURE — 88313 SPECIAL STAINS GROUP 2: CPT | Mod: 59 | Performed by: STUDENT IN AN ORGANIZED HEALTH CARE EDUCATION/TRAINING PROGRAM

## 2024-05-09 PROCEDURE — 61680 INTRACRANIAL VESSEL SURGERY: CPT | Mod: ,,, | Performed by: NEUROLOGICAL SURGERY

## 2024-05-09 PROCEDURE — 81003 URINALYSIS AUTO W/O SCOPE: CPT

## 2024-05-09 PROCEDURE — 88307 TISSUE EXAM BY PATHOLOGIST: CPT | Performed by: STUDENT IN AN ORGANIZED HEALTH CARE EDUCATION/TRAINING PROGRAM

## 2024-05-09 PROCEDURE — 85025 COMPLETE CBC W/AUTO DIFF WBC: CPT | Mod: 91

## 2024-05-09 PROCEDURE — 85730 THROMBOPLASTIN TIME PARTIAL: CPT | Performed by: STUDENT IN AN ORGANIZED HEALTH CARE EDUCATION/TRAINING PROGRAM

## 2024-05-09 PROCEDURE — 99223 1ST HOSP IP/OBS HIGH 75: CPT | Mod: FS,,, | Performed by: INTERNAL MEDICINE

## 2024-05-09 PROCEDURE — D9220A PRA ANESTHESIA: Mod: ANES,,, | Performed by: ANESTHESIOLOGY

## 2024-05-09 PROCEDURE — 8E09XBZ COMPUTER ASSISTED PROCEDURE OF HEAD AND NECK REGION: ICD-10-PCS | Performed by: NEUROLOGICAL SURGERY

## 2024-05-09 PROCEDURE — 20000000 HC ICU ROOM

## 2024-05-09 PROCEDURE — 00B70ZX EXCISION OF CEREBRAL HEMISPHERE, OPEN APPROACH, DIAGNOSTIC: ICD-10-PCS | Performed by: NEUROLOGICAL SURGERY

## 2024-05-09 PROCEDURE — 85610 PROTHROMBIN TIME: CPT | Performed by: STUDENT IN AN ORGANIZED HEALTH CARE EDUCATION/TRAINING PROGRAM

## 2024-05-09 PROCEDURE — 85025 COMPLETE CBC W/AUTO DIFF WBC: CPT | Performed by: STUDENT IN AN ORGANIZED HEALTH CARE EDUCATION/TRAINING PROGRAM

## 2024-05-09 PROCEDURE — 36620 INSERTION CATHETER ARTERY: CPT | Mod: 59,,, | Performed by: NURSE ANESTHETIST, CERTIFIED REGISTERED

## 2024-05-09 PROCEDURE — 00970ZZ DRAINAGE OF CEREBRAL HEMISPHERE, OPEN APPROACH: ICD-10-PCS | Performed by: NEUROLOGICAL SURGERY

## 2024-05-09 PROCEDURE — 25000003 PHARM REV CODE 250: Performed by: NEUROLOGICAL SURGERY

## 2024-05-09 PROCEDURE — 37000008 HC ANESTHESIA 1ST 15 MINUTES: Performed by: NEUROLOGICAL SURGERY

## 2024-05-09 PROCEDURE — 36000713 HC OR TIME LEV V EA ADD 15 MIN: Performed by: NEUROLOGICAL SURGERY

## 2024-05-09 PROCEDURE — 63600175 PHARM REV CODE 636 W HCPCS: Performed by: ANESTHESIOLOGY

## 2024-05-09 PROCEDURE — 27201423 OPTIME MED/SURG SUP & DEVICES STERILE SUPPLY: Performed by: NEUROLOGICAL SURGERY

## 2024-05-09 PROCEDURE — 64450 NJX AA&/STRD OTHER PN/BRANCH: CPT | Mod: 59,50,, | Performed by: NURSE ANESTHETIST, CERTIFIED REGISTERED

## 2024-05-09 PROCEDURE — D9220A PRA ANESTHESIA: Mod: CRNA,,, | Performed by: NURSE ANESTHETIST, CERTIFIED REGISTERED

## 2024-05-09 DEVICE — SCREW UN3 AXS SD 1.5X4MM: Type: IMPLANTABLE DEVICE | Site: CRANIAL | Status: FUNCTIONAL

## 2024-05-09 DEVICE — PLATE BONE BUR HLE CVR 7MM TAB: Type: IMPLANTABLE DEVICE | Site: CRANIAL | Status: FUNCTIONAL

## 2024-05-09 DEVICE — PLATE BONE 2X2 HOLE SM BOX: Type: IMPLANTABLE DEVICE | Site: CRANIAL | Status: FUNCTIONAL

## 2024-05-09 RX ORDER — LEVETIRACETAM 500 MG/5ML
500 INJECTION, SOLUTION, CONCENTRATE INTRAVENOUS EVERY 12 HOURS
Status: DISCONTINUED | OUTPATIENT
Start: 2024-05-09 | End: 2024-05-11 | Stop reason: HOSPADM

## 2024-05-09 RX ORDER — ONDANSETRON HYDROCHLORIDE 2 MG/ML
4 INJECTION, SOLUTION INTRAVENOUS EVERY 8 HOURS PRN
Status: DISCONTINUED | OUTPATIENT
Start: 2024-05-09 | End: 2024-05-11 | Stop reason: HOSPADM

## 2024-05-09 RX ORDER — MUPIROCIN 20 MG/G
1 OINTMENT TOPICAL 2 TIMES DAILY
Status: DISCONTINUED | OUTPATIENT
Start: 2024-05-09 | End: 2024-05-09 | Stop reason: HOSPADM

## 2024-05-09 RX ORDER — LIDOCAINE HYDROCHLORIDE AND EPINEPHRINE 10; 10 MG/ML; UG/ML
INJECTION, SOLUTION INFILTRATION; PERINEURAL
Status: DISCONTINUED | OUTPATIENT
Start: 2024-05-09 | End: 2024-05-09 | Stop reason: HOSPADM

## 2024-05-09 RX ORDER — FENTANYL CITRATE 50 UG/ML
INJECTION, SOLUTION INTRAMUSCULAR; INTRAVENOUS
Status: DISCONTINUED | OUTPATIENT
Start: 2024-05-09 | End: 2024-05-09

## 2024-05-09 RX ORDER — OXYCODONE HYDROCHLORIDE 5 MG/1
5 TABLET ORAL EVERY 6 HOURS PRN
Status: DISCONTINUED | OUTPATIENT
Start: 2024-05-09 | End: 2024-05-11 | Stop reason: HOSPADM

## 2024-05-09 RX ORDER — MAGNESIUM SULFATE HEPTAHYDRATE 40 MG/ML
INJECTION, SOLUTION INTRAVENOUS
Status: DISCONTINUED | OUTPATIENT
Start: 2024-05-09 | End: 2024-05-09

## 2024-05-09 RX ORDER — PHENYLEPHRINE HYDROCHLORIDE 10 MG/ML
INJECTION INTRAVENOUS
Status: DISCONTINUED | OUTPATIENT
Start: 2024-05-09 | End: 2024-05-09

## 2024-05-09 RX ORDER — MUPIROCIN 20 MG/G
OINTMENT TOPICAL
Status: DISCONTINUED | OUTPATIENT
Start: 2024-05-09 | End: 2024-05-09 | Stop reason: HOSPADM

## 2024-05-09 RX ORDER — LANOLIN ALCOHOL/MO/W.PET/CERES
800 CREAM (GRAM) TOPICAL
Status: DISCONTINUED | OUTPATIENT
Start: 2024-05-09 | End: 2024-05-11

## 2024-05-09 RX ORDER — PROPOFOL 10 MG/ML
VIAL (ML) INTRAVENOUS
Status: DISCONTINUED | OUTPATIENT
Start: 2024-05-09 | End: 2024-05-09

## 2024-05-09 RX ORDER — AMOXICILLIN 250 MG
1 CAPSULE ORAL 2 TIMES DAILY
Status: DISCONTINUED | OUTPATIENT
Start: 2024-05-09 | End: 2024-05-11 | Stop reason: HOSPADM

## 2024-05-09 RX ORDER — SODIUM,POTASSIUM PHOSPHATES 280-250MG
2 POWDER IN PACKET (EA) ORAL
Status: DISCONTINUED | OUTPATIENT
Start: 2024-05-09 | End: 2024-05-11

## 2024-05-09 RX ORDER — BACITRACIN ZINC 500 UNIT/G
OINTMENT (GRAM) TOPICAL
Status: DISCONTINUED | OUTPATIENT
Start: 2024-05-09 | End: 2024-05-09 | Stop reason: HOSPADM

## 2024-05-09 RX ORDER — MIDAZOLAM HYDROCHLORIDE 1 MG/ML
INJECTION, SOLUTION INTRAMUSCULAR; INTRAVENOUS
Status: DISCONTINUED | OUTPATIENT
Start: 2024-05-09 | End: 2024-05-09

## 2024-05-09 RX ORDER — SODIUM CHLORIDE 9 MG/ML
INJECTION, SOLUTION INTRAVENOUS CONTINUOUS
Status: DISCONTINUED | OUTPATIENT
Start: 2024-05-09 | End: 2024-05-10

## 2024-05-09 RX ORDER — DEXAMETHASONE SODIUM PHOSPHATE 4 MG/ML
4 INJECTION, SOLUTION INTRA-ARTICULAR; INTRALESIONAL; INTRAMUSCULAR; INTRAVENOUS; SOFT TISSUE EVERY 6 HOURS
Status: DISCONTINUED | OUTPATIENT
Start: 2024-05-09 | End: 2024-05-11 | Stop reason: HOSPADM

## 2024-05-09 RX ORDER — DEXAMETHASONE SODIUM PHOSPHATE 4 MG/ML
INJECTION, SOLUTION INTRA-ARTICULAR; INTRALESIONAL; INTRAMUSCULAR; INTRAVENOUS; SOFT TISSUE
Status: DISCONTINUED | OUTPATIENT
Start: 2024-05-09 | End: 2024-05-09

## 2024-05-09 RX ORDER — MORPHINE SULFATE 2 MG/ML
2 INJECTION, SOLUTION INTRAMUSCULAR; INTRAVENOUS EVERY 4 HOURS PRN
Status: DISCONTINUED | OUTPATIENT
Start: 2024-05-09 | End: 2024-05-11 | Stop reason: HOSPADM

## 2024-05-09 RX ORDER — LEVETIRACETAM 500 MG/5ML
INJECTION, SOLUTION, CONCENTRATE INTRAVENOUS
Status: DISCONTINUED | OUTPATIENT
Start: 2024-05-09 | End: 2024-05-09

## 2024-05-09 RX ORDER — LIDOCAINE HYDROCHLORIDE 20 MG/ML
INJECTION, SOLUTION EPIDURAL; INFILTRATION; INTRACAUDAL; PERINEURAL
Status: DISCONTINUED | OUTPATIENT
Start: 2024-05-09 | End: 2024-05-09

## 2024-05-09 RX ORDER — ACETAMINOPHEN 325 MG/1
650 TABLET ORAL EVERY 6 HOURS PRN
Status: DISCONTINUED | OUTPATIENT
Start: 2024-05-09 | End: 2024-05-11 | Stop reason: HOSPADM

## 2024-05-09 RX ORDER — POLYETHYLENE GLYCOL 3350 17 G/17G
17 POWDER, FOR SOLUTION ORAL DAILY
Status: DISCONTINUED | OUTPATIENT
Start: 2024-05-09 | End: 2024-05-11 | Stop reason: HOSPADM

## 2024-05-09 RX ORDER — ONDANSETRON HYDROCHLORIDE 2 MG/ML
INJECTION, SOLUTION INTRAVENOUS
Status: DISCONTINUED | OUTPATIENT
Start: 2024-05-09 | End: 2024-05-09

## 2024-05-09 RX ORDER — ROCURONIUM BROMIDE 10 MG/ML
INJECTION, SOLUTION INTRAVENOUS
Status: DISCONTINUED | OUTPATIENT
Start: 2024-05-09 | End: 2024-05-09

## 2024-05-09 RX ORDER — SODIUM CHLORIDE 0.9 % (FLUSH) 0.9 %
10 SYRINGE (ML) INJECTION
Status: DISCONTINUED | OUTPATIENT
Start: 2024-05-09 | End: 2024-05-10

## 2024-05-09 RX ADMIN — ROCURONIUM BROMIDE 50 MG: 10 INJECTION, SOLUTION INTRAVENOUS at 10:05

## 2024-05-09 RX ADMIN — PHENYLEPHRINE HYDROCHLORIDE 80 MCG: 10 INJECTION INTRAVENOUS at 11:05

## 2024-05-09 RX ADMIN — ROPIVACAINE HYDROCHLORIDE 40 ML: 5 INJECTION EPIDURAL; INFILTRATION; PERINEURAL at 10:05

## 2024-05-09 RX ADMIN — MIDAZOLAM HYDROCHLORIDE 2 MG: 2 INJECTION, SOLUTION INTRAMUSCULAR; INTRAVENOUS at 09:05

## 2024-05-09 RX ADMIN — CEFAZOLIN 2 G: 2 INJECTION, POWDER, FOR SOLUTION INTRAMUSCULAR; INTRAVENOUS at 09:05

## 2024-05-09 RX ADMIN — PROPOFOL 160 MG: 10 INJECTION, EMULSION INTRAVENOUS at 10:05

## 2024-05-09 RX ADMIN — FENTANYL CITRATE 50 MCG: 50 INJECTION, SOLUTION INTRAMUSCULAR; INTRAVENOUS at 11:05

## 2024-05-09 RX ADMIN — SODIUM CHLORIDE, SODIUM GLUCONATE, SODIUM ACETATE, POTASSIUM CHLORIDE, MAGNESIUM CHLORIDE, SODIUM PHOSPHATE, DIBASIC, AND POTASSIUM PHOSPHATE: .53; .5; .37; .037; .03; .012; .00082 INJECTION, SOLUTION INTRAVENOUS at 10:05

## 2024-05-09 RX ADMIN — LEVETIRACETAM 1000 MG: 100 INJECTION, SOLUTION INTRAVENOUS at 10:05

## 2024-05-09 RX ADMIN — DEXAMETHASONE SODIUM PHOSPHATE 4 MG: 4 INJECTION INTRA-ARTICULAR; INTRALESIONAL; INTRAMUSCULAR; INTRAVENOUS; SOFT TISSUE at 07:05

## 2024-05-09 RX ADMIN — ACETAMINOPHEN 650 MG: 325 TABLET ORAL at 09:05

## 2024-05-09 RX ADMIN — FENTANYL CITRATE 50 MCG: 50 INJECTION, SOLUTION INTRAMUSCULAR; INTRAVENOUS at 01:05

## 2024-05-09 RX ADMIN — MAGNESIUM SULFATE 1 G: 2 INJECTION INTRAVENOUS at 12:05

## 2024-05-09 RX ADMIN — DEXAMETHASONE SODIUM PHOSPHATE 12 MG: 4 INJECTION, SOLUTION INTRAMUSCULAR; INTRAVENOUS at 10:05

## 2024-05-09 RX ADMIN — ONDANSETRON 4 MG: 2 INJECTION INTRAMUSCULAR; INTRAVENOUS at 12:05

## 2024-05-09 RX ADMIN — DEXAMETHASONE SODIUM PHOSPHATE 4 MG: 4 INJECTION INTRA-ARTICULAR; INTRALESIONAL; INTRAMUSCULAR; INTRAVENOUS; SOFT TISSUE at 01:05

## 2024-05-09 RX ADMIN — SODIUM CHLORIDE: 0.9 INJECTION, SOLUTION INTRAVENOUS at 09:05

## 2024-05-09 RX ADMIN — SUGAMMADEX 200 MG: 100 INJECTION, SOLUTION INTRAVENOUS at 12:05

## 2024-05-09 RX ADMIN — LEVETIRACETAM 500 MG: 100 INJECTION INTRAVENOUS at 08:05

## 2024-05-09 RX ADMIN — DOCUSATE SODIUM AND SENNOSIDES 1 TABLET: 8.6; 5 TABLET, FILM COATED ORAL at 08:05

## 2024-05-09 RX ADMIN — LIDOCAINE HYDROCHLORIDE 100 MG: 20 INJECTION, SOLUTION EPIDURAL; INFILTRATION; INTRACAUDAL; PERINEURAL at 10:05

## 2024-05-09 RX ADMIN — MUPIROCIN: 20 OINTMENT TOPICAL at 08:05

## 2024-05-09 RX ADMIN — CEFAZOLIN 2 G: 2 INJECTION, POWDER, FOR SOLUTION INTRAMUSCULAR; INTRAVENOUS at 01:05

## 2024-05-09 RX ADMIN — PROPOFOL 40 MG: 10 INJECTION, EMULSION INTRAVENOUS at 11:05

## 2024-05-09 RX ADMIN — CEFTRIAXONE 2 G: 1 INJECTION, POWDER, FOR SOLUTION INTRAMUSCULAR; INTRAVENOUS at 10:05

## 2024-05-09 RX ADMIN — FENTANYL CITRATE 50 MCG: 50 INJECTION, SOLUTION INTRAMUSCULAR; INTRAVENOUS at 10:05

## 2024-05-09 NOTE — ANESTHESIA POSTPROCEDURE EVALUATION
Anesthesia Post Evaluation    Patient: Teto Chong    Procedure(s) Performed: Procedure(s) (LRB):  CRANIOTOMY FOR BIOPSY, WITH NEOPLASM EXCISION USING COMPUTER-ASSISTED NAVIGATION- BRAIN LAB (Right)    Final Anesthesia Type: general      Patient location during evaluation: ICU  Patient participation: Yes- Able to Participate  Level of consciousness: awake  Post-procedure vital signs: reviewed and stable  Pain management: adequate (shivering treated with Mg)  Airway patency: patent    PONV status at discharge: No PONV  Anesthetic complications: no      Cardiovascular status: blood pressure returned to baseline  Respiratory status: unassisted  Hydration status: euvolemic  Follow-up not needed.          Vitals Value Taken Time   /65 05/09/24 1341   Temp 36.4 °C (97.6 °F) 05/09/24 1315   Pulse 91 05/09/24 1401   Resp 17 05/09/24 1401   SpO2 98 % 05/09/24 1401   Vitals shown include unfiled device data.      No case tracking events are documented in the log.      Pain/Cesar Score: No data recorded

## 2024-05-09 NOTE — HPI
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 Admitted to [facility] for post op care and monitoring.

## 2024-05-09 NOTE — H&P
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 Admitted to [facility] for post op care and monitoring.       Past Medical History:   Diagnosis Date    Bipolar 1 disorder     Brain mass     Hepatitis B      Past Surgical History:   Procedure Laterality Date    COLONOSCOPIC SURGICAL PROCEDURE      2014      No current facility-administered medications on file prior to encounter.     Current Outpatient Medications on File Prior to Encounter   Medication Sig Dispense Refill    acetaminophen (TYLENOL) 325 MG tablet Take 325 mg by mouth as needed for Pain.        Allergies: Wasp sting [allergen ext-venom-honey bee]  Family History   Problem Relation Name Age of Onset    Hypertension Mother      Stomach cancer Father       Social History     Tobacco Use    Smoking status: Every Day     Current packs/day: 0.50     Types: Cigarettes   Substance Use Topics    Alcohol use: Not Currently    Drug use: No     Review of Systems   Constitutional:  Negative for chills and fever.   HENT:  Negative for sore throat and voice change.    Eyes:  Positive for visual disturbance. Negative for photophobia.   Respiratory:  Negative for cough and stridor.    Cardiovascular:  Negative for chest pain and palpitations.   Gastrointestinal:  Negative for nausea and vomiting.   Genitourinary:  Negative for difficulty urinating and dysuria.   Musculoskeletal:  Negative for arthralgias and myalgias.   Skin:  Negative for color change and pallor.   Neurological:  Positive for headaches. Negative for numbness.     Objective:     Vitals:    Temp: 97.6 °F (36.4 °C)  Pulse: 96  BP: (!)  148/72  MAP (mmHg): 97  Resp: 16  SpO2: 97 %    Temp  Min: 97.6 °F (36.4 °C)  Max: 98.2 °F (36.8 °C)  Pulse  Min: 86  Max: 96  BP  Min: 100/62  Max: 148/72  MAP (mmHg)  Min: 86  Max: 97  Resp  Min: 16  Max: 18  SpO2  Min: 97 %  Max: 100 %    No intake/output data recorded.            Physical Exam  Constitutional:       General: He is not in acute distress.     Appearance: Normal appearance.   Cardiovascular:      Rate and Rhythm: Normal rate and regular rhythm.   Pulmonary:      Effort: Pulmonary effort is normal.   Skin:     General: Skin is warm and dry.   Neurological:      Mental Status: He is easily aroused.      Comments: E4V5M6.  PERRL. EOMI.  Left hemianopsia present.  Oriented to person, place, time, and orientation.   Moves all extremities spontaneously and follows commands.   RUE 4/5  LUE 4/5  RLE 4/5  LLE 4/5  Sensation grossly intact.            Gait deferred.        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    - Admit to [facility]  - Neurosurgery following  - Neuro check and vital signs hourly  - CBC, CMP, Mag, Phos daily  - Post op CTH pending  - SBP goal < 160  - EKG pending  - Keppra q12h  - dexamethasone q6h  - cefazolin q8h for 24h  - SCDs      GI  History of hepatitis B  History of    Other  Current every day smoker  -  on cessation when appropriate          The patient is being Prophylaxed for:  Venous Thromboembolism with: Mechanical  Stress Ulcer with: Not Applicable   Ventilator Pneumonia with: not applicable    Activity Orders            Progressive Mobility Protocol (mobilize patient to their highest level of functioning at least  twice daily) starting at 05/09 2000    Diet Adult Regular (IDDSI Level 7): Regular starting at 05/09 1246          Full Code    Level III    Emilee Gaxiola NP  Neurocritical Care  [facility] - [facility]

## 2024-05-09 NOTE — PROGRESS NOTES
"Admitted From: OR > [facility]    Admit Time/Date: 05/09/2024 at 1315    Admit Diagnosis: Craniotomy with brain Mass resection    Temperature: 97.6 (oral)    Weight: 66.5kg (bed scale)    Height: 5'7" (measured)    Personal Belongings: None    Nurses Note -- 4 Eyes  Skin assessed during: Transfer    [x] No Altered Skin Integrity Present    [x]Prevention Measures Documented      [] Yes- Altered Skin Integrity Present or Discovered   [] LDA Added if Not in Epic (Describe Wound)   [] New Altered Skin Integrity was Present on Admit and Documented in LDA   [] Wound Image Taken    Wound Care Consulted? No    Attending Nurse:   Shilpa Strong RN/Staff Member:  Ori MALONEY called [phone] and Santiago CARLSON at bedside    Bed in lowest position, wheels locked, call light in reach, bed alarm set, will continue to monitor.                    "

## 2024-05-09 NOTE — SUBJECTIVE & OBJECTIVE
Past Medical History:   Diagnosis Date    Bipolar 1 disorder     Brain mass     Hepatitis B      Past Surgical History:   Procedure Laterality Date    COLONOSCOPIC SURGICAL PROCEDURE      2014      No current facility-administered medications on file prior to encounter.     Current Outpatient Medications on File Prior to Encounter   Medication Sig Dispense Refill    acetaminophen (TYLENOL) 325 MG tablet Take 325 mg by mouth as needed for Pain.        Allergies: Wasp sting [allergen ext-venom-honey bee]  Family History   Problem Relation Name Age of Onset    Hypertension Mother      Stomach cancer Father       Social History     Tobacco Use    Smoking status: Every Day     Current packs/day: 0.50     Types: Cigarettes   Substance Use Topics    Alcohol use: Not Currently    Drug use: No     Review of Systems   Constitutional:  Negative for chills and fever.   HENT:  Negative for sore throat and voice change.    Eyes:  Positive for visual disturbance. Negative for photophobia.   Respiratory:  Negative for cough and stridor.    Cardiovascular:  Negative for chest pain and palpitations.   Gastrointestinal:  Negative for nausea and vomiting.   Genitourinary:  Negative for difficulty urinating and dysuria.   Musculoskeletal:  Negative for arthralgias and myalgias.   Skin:  Negative for color change and pallor.   Neurological:  Positive for headaches. Negative for numbness.     Objective:     Vitals:    Temp: 97.6 °F (36.4 °C)  Pulse: 96  BP: (!) 148/72  MAP (mmHg): 97  Resp: 16  SpO2: 97 %    Temp  Min: 97.6 °F (36.4 °C)  Max: 98.2 °F (36.8 °C)  Pulse  Min: 86  Max: 96  BP  Min: 100/62  Max: 148/72  MAP (mmHg)  Min: 86  Max: 97  Resp  Min: 16  Max: 18  SpO2  Min: 97 %  Max: 100 %    No intake/output data recorded.            Physical Exam  Constitutional:       General: He is not in acute distress.     Appearance: Normal appearance.   Cardiovascular:      Rate and Rhythm: Normal rate and regular rhythm.   Pulmonary:       Effort: Pulmonary effort is normal.   Skin:     General: Skin is warm and dry.   Neurological:      Mental Status: He is easily aroused.      Comments: E4V5M6.  PERRL. EOMI.  Left hemianopsia present.  Oriented to person, place, time, and orientation.   Moves all extremities spontaneously and follows commands.   RUE 4/5  LUE 4/5  RLE 4/5  LLE 4/5  Sensation grossly intact.            Gait deferred.        Today I personally reviewed pertinent medications, lines/drains/airways, imaging, laboratory results, notably: CBC, aPTT, PT/INR, MRI

## 2024-05-09 NOTE — BRIEF OP NOTE
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 Item Serial No.  Lot No. LRB No. Used Action   SCREW UN3 AXS SD 1.5X4MM - [id number]  SCREW UN3 AXS SD 1.5X4MM  [employer].  Right 13 Implanted   PLATE BONE BUR HLE CVR 7MM TAB - [id number]  PLATE BONE BUR HLE CVR 7MM TAB  [employer].  Right 1 Implanted   PLATE BONE 2X2 HOLE SM BOX - [id number]  PLATE BONE 2X2 HOLE SM BOX  [employer].  Right 2 Implanted       Operative Findings: right occipital craniotomy for cavernoma resection    Estimated Blood Loss: * No values recorded between 5/9/2024 11:23 AM and 5/9/2024 12:44 PM *    Estimated Blood Loss has been documented.         Specimens:   Specimen (24h ago, onward)       Start     Ordered    05/09/24 1211  Specimen to Pathology, Surgery Neurosurgery  Once        Comments: Pre-op Diagnosis: Brain mass [G93.89]Procedure(s):CRANIOTOMY FOR BIOPSY, WITH NEOPLASM EXCISION USING COMPUTER-ASSISTED NAVIGATION- BRAIN LAB Number of specimens: 1Name of specimens: 1. BRAIN LESION - PERMANENT     References:    Click here for ordering Quick Tip   Question Answer Comment   Procedure Type: Neurosurgery    Release to patient Immediate        05/09/24 1231                    [id number]

## 2024-05-09 NOTE — PROGRESS NOTES
1602 - Patient traveled to CT via bed connected to portable monitor with ambubag at bedside. Traveled with RN x1. Vitals stable during transport. Will continue to monitor during imaging.     1634 - Patient returned back to room 9080 via CT. Vitals stable during imaging and transport. Bed locked, wheels in lowest position, call light in reach.

## 2024-05-09 NOTE — TRANSFER OF CARE
"Anesthesia Transfer of Care Note    Patient: Teto Chong    Procedure(s) Performed: Procedure(s) (LRB):  CRANIOTOMY FOR BIOPSY, WITH NEOPLASM EXCISION USING COMPUTER-ASSISTED NAVIGATION- BRAIN LAB (Right)    Patient location: ICU    Anesthesia Type: general    Transport from OR: Transported from OR on 6-10 L/min O2 by face mask with adequate spontaneous ventilation. Continuous ECG monitoring in transport. Continuous SpO2 monitoring in transport. Continuos invasive BP monitoring in transport    Post pain: adequate analgesia    Post assessment: no apparent anesthetic complications    Post vital signs: stable    Level of consciousness: awake and alert    Nausea/Vomiting: no nausea/vomiting    Complications: none    Transfer of care protocol was followed      Last vitals: Visit Vitals  /72 (BP Location: Left arm, Patient Position: Lying)   Pulse 86   Temp 36.8 °C (98.2 °F) (Temporal)   Resp 18   Ht 5' 7" (1.702 m)   Wt 65.8 kg (145 lb)   SpO2 100%   BMI 22.71 kg/m²     "

## 2024-05-09 NOTE — ANESTHESIA PROCEDURE NOTES
Intubation    Date/Time: 5/9/2024 10:10 AM    Performed by: Isabelle Torres CRNA  Authorized by: Donte Tovar MD    Intubation:     Induction:  Intravenous    Intubated:  Postinduction    Mask Ventilation:  Easy mask    Attempts:  1    Attempted By:  CRNA    Method of Intubation:  Video laryngoscopy    Blade:  Hernandez 3    Laryngeal View Grade: Grade I - full view of cords      Difficult Airway Encountered?: No      Complications:  None    Airway Device:  Oral endotracheal tube    Airway Device Size:  7.5    Style/Cuff Inflation:  Cuffed (inflated to minimal occlusive pressure)    Tube secured:  22    Secured at:  The lips    Placement Verified By:  Capnometry    Complicating Factors:  None    Findings Post-Intubation:  BS equal bilateral and atraumatic/condition of teeth unchanged

## 2024-05-09 NOTE — ANESTHESIA PREPROCEDURE EVALUATION
05/09/2024  Teto Chong is a 43 y.o., male.      Pre-op Assessment    I have reviewed the Patient Summary Reports.       I have reviewed the Medications.     Review of Systems  Anesthesia Hx:  No problems with previous Anesthesia   Neg history of prior surgery.          Denies Family Hx of Anesthesia complications.    Denies Personal Hx of Anesthesia complications.                    Hematology/Oncology:  Hematology Normal   Oncology Normal                                   EENT/Dental:  EENT/Dental Normal           Cardiovascular:  Exercise tolerance: good          Denies Angina.          Functional Capacity good / => 4 METS            Carotoid Artery Disease               Pulmonary:  Pulmonary Normal                       Renal/:  Renal/ Normal                 Hepatic/GI:      Denies GERD.             Neurological:      Headaches     Occipital brain tumor, no midline shift; <30mm  Denies Pain                                 Endocrine:  Endocrine Normal            Psych:  Psychiatric Normal          Phobia and Claustrophobia.      Physical Exam  General: Well nourished and Cooperative    Airway:  Mallampati: I   Mouth Opening: Normal  TM Distance: Normal  Tongue: Normal  Neck ROM: Normal ROM    Dental:  Intact    Chest/Lungs:  Clear to auscultation, Normal Respiratory Rate    Heart:  Rate: Normal    Anesthesia Plan  Type of Anesthesia, risks & benefits discussed:    Anesthesia Type: Gen ETT  Intra-op Monitoring Plan: Standard ASA Monitors and Art Line  Post Op Pain Control Plan: multimodal analgesia and IV/PO Opioids PRN  Induction:  IV  Airway Plan: , Post-Induction  Informed Consent: Informed consent signed with the Patient and all parties understand the risks and agree with anesthesia plan.  All questions answered.   ASA Score: 2  Day of Surgery Review of History & Physical: H&P Update referred  None to the surgeon/provider.    Ready For Surgery From Anesthesia Perspective.   .

## 2024-05-09 NOTE — ASSESSMENT & PLAN NOTE
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    - Admit to NCC  - Neurosurgery following  - Neuro check and vital signs hourly  - CBC, CMP, Mag, Phos daily  - Post op CTH pending  - SBP goal < 160  - EKG pending  - Keppra q12h  - dexamethasone q6h  - cefazolin q8h for 24h  - SCDs

## 2024-05-09 NOTE — PLAN OF CARE
[facility] - Neuro Critical Care  Initial Discharge Assessment       Primary Care Provider: Ana Rosa Lind MD    Admission Diagnosis: Brain mass [G93.89]  Brain tumor [D49.6]    Admission Date: 5/9/2024  Expected Discharge Date: 5/11/2024    Transition of Care Barriers: None    Payor: BLUE CROSS BLUE SHIELD / Plan: BCBS ALL OUT OF STATE / Product Type: PPO /     Extended Emergency Contact Information  Primary Emergency Contact: Krishan Chong   [facility]  Home Phone: [phone]  Work Phone: [phone]  Mobile Phone: [phone]  Relation: Brother    Discharge Plan A: Home  Discharge Plan B: Home      [facility] [id number] at [facility], [place] - [address] AT [facility] [address]  [address]  [address]  [facility] [address]  Phone: [phone] Fax: [phone]      Transferred from:     Past Medical History:   Diagnosis Date    Bipolar 1 disorder     Brain mass     Hepatitis B          CM met with patient and Krishan Chong (brother) [phone] in room for Discharge Planning Assessment.  Patient is able to answer questions and stated he speaks English and does not need any materials given in Azeri.  Per patient, he lives with his mother and brother in a single story house with 0 step(s) to enter.   Per patient, he was independent with ADLS and used no dme for ambulation.  Patient will have assistance from his mother and brother upon discharge.   Discharge Planning Booklet given to patient/family and discussed.  All questions addressed.  CM will follow for needs.    Per patient, he does not have home health, is not on dialyisis, does not take Coumadin and has not been hospitalized in the past 30 days.           Initial Assessment (most recent)       Adult Discharge Assessment - 05/09/24 1538          Discharge Assessment    Assessment Type Discharge Planning Assessment     Confirmed/corrected address, phone number and insurance Yes     Confirmed Demographics Correct on Facesheet      Source of Information patient     Communicated NARESH with patient/caregiver Yes     Reason For Admission Brain Mass     People in Home parent(s);sibling(s)     Facility Arrived From: home     Do you expect to return to your current living situation? Yes     Do you have help at home or someone to help you manage your care at home? Yes     Who are your caregiver(s) and their phone number(s)? Krishan Chong (brother) [phone]     Prior to hospitilization cognitive status: Alert/Oriented     Current cognitive status: Alert/Oriented     Walking or Climbing Stairs Difficulty no     Dressing/Bathing Difficulty no     Home Accessibility stairs to enter home     Number of Stairs, Main Entrance none     Stairs Comment, Main Entrance none     Home Layout Able to live on 1st floor     Equipment Currently Used at Home none     Readmission within 30 days? No     Patient currently being followed by outpatient case management? No     Do you currently have service(s) that help you manage your care at home? No     Do you take prescription medications? No     Do you have prescription coverage? Yes     Coverage BCBS     Do you have any problems affording any of your prescribed medications? No     Is the patient taking medications as prescribed? yes     Who is going to help you get home at discharge? Krishan Chong (brother) [phone]     How do you get to doctors appointments? family or friend will provide     Are you on dialysis? No     Do you take coumadin? No     Discharge Plan A Home     Discharge Plan B Home     DME Needed Upon Discharge  none     Discharge Plan discussed with: Patient     Transition of Care Barriers None        Physical Activity    On average, how many days per week do you engage in moderate to strenuous exercise (like a brisk walk)? 0 days     On average, how many minutes do you engage in exercise at this level? 0 min        Financial Resource Strain    How hard is it for you to pay for the very basics like  food, housing, medical care, and heating? Hard        Housing Stability    In the last 12 months, was there a time when you were not able to pay the mortgage or rent on time? No     At any time in the past 12 months, were you homeless or living in a shelter (including now)? No        Transportation Needs    Has the lack of transportation kept you from medical appointments, meetings, work or from getting things needed for daily living? No        Food Insecurity    Within the past 12 months, you worried that your food would run out before you got the money to buy more. Sometimes true     Within the past 12 months, the food you bought just didn't last and you didn't have money to get more. Never true        Stress    Do you feel stress - tense, restless, nervous, or anxious, or unable to sleep at night because your mind is troubled all the time - these days? To some extent        Social Isolation    How often do you feel lonely or isolated from those around you?  Often        Alcohol Use    Q1: How often do you have a drink containing alcohol? Monthly or less     Q2: How many drinks containing alcohol do you have on a typical day when you are drinking? 1 or 2     Q3: How often do you have six or more drinks on one occasion? Never        Utilities    In the past 12 months has the electric, gas, oil, or water company threatened to shut off services in your home? No        Health Literacy    How often do you need to have someone help you when you read instructions, pamphlets, or other written material from your doctor or pharmacy? Never        OTHER    Name(s) of People in Home Krishan Chong (brother) [phone]                      Discharge Plan A and Plan B have been determined by review of patient's clinical status, future medical and therapeutic needs, and coverage/benefits for post-acute care in coordination with multidisciplinary team members.      Danelle Roque RN, CCRN-K, [facility]  Neuro-Critical Care Case  Manager  [phone]

## 2024-05-10 LAB
ALBUMIN SERPL BCP-MCNC: 3.6 G/DL (ref 3.5–5.2)
ALP SERPL-CCNC: 50 U/L (ref 55–135)
ALT SERPL W/O P-5'-P-CCNC: 34 U/L (ref 10–44)
ANION GAP SERPL CALC-SCNC: 8 MMOL/L (ref 8–16)
AST SERPL-CCNC: 19 U/L (ref 10–40)
BASOPHILS # BLD AUTO: 0.02 K/UL (ref 0–0.2)
BASOPHILS NFR BLD: 0.1 % (ref 0–1.9)
BILIRUB SERPL-MCNC: 0.4 MG/DL (ref 0.1–1)
BUN SERPL-MCNC: 11 MG/DL (ref 6–20)
CALCIUM SERPL-MCNC: 8.7 MG/DL (ref 8.7–10.5)
CHLORIDE SERPL-SCNC: 105 MMOL/L (ref 95–110)
CO2 SERPL-SCNC: 21 MMOL/L (ref 23–29)
CREAT SERPL-MCNC: 0.8 MG/DL (ref 0.5–1.4)
DIFFERENTIAL METHOD BLD: ABNORMAL
EOSINOPHIL # BLD AUTO: 0 K/UL (ref 0–0.5)
EOSINOPHIL NFR BLD: 0 % (ref 0–8)
ERYTHROCYTE [DISTWIDTH] IN BLOOD BY AUTOMATED COUNT: 11.7 % (ref 11.5–14.5)
EST. GFR  (NO RACE VARIABLE): >60 ML/MIN/1.73 M^2
GLUCOSE SERPL-MCNC: 145 MG/DL (ref 70–110)
HCT VFR BLD AUTO: 36.4 % (ref 40–54)
HGB BLD-MCNC: 12.5 G/DL (ref 14–18)
IMM GRANULOCYTES # BLD AUTO: 0.07 K/UL (ref 0–0.04)
IMM GRANULOCYTES NFR BLD AUTO: 0.5 % (ref 0–0.5)
LYMPHOCYTES # BLD AUTO: 0.8 K/UL (ref 1–4.8)
LYMPHOCYTES NFR BLD: 5.8 % (ref 18–48)
MAGNESIUM SERPL-MCNC: 2 MG/DL (ref 1.6–2.6)
MCH RBC QN AUTO: 32.6 PG (ref 27–31)
MCHC RBC AUTO-ENTMCNC: 34.3 G/DL (ref 32–36)
MCV RBC AUTO: 95 FL (ref 82–98)
MONOCYTES # BLD AUTO: 0.7 K/UL (ref 0.3–1)
MONOCYTES NFR BLD: 4.6 % (ref 4–15)
NEUTROPHILS # BLD AUTO: 12.5 K/UL (ref 1.8–7.7)
NEUTROPHILS NFR BLD: 89 % (ref 38–73)
NRBC BLD-RTO: 0 /100 WBC
PHOSPHATE SERPL-MCNC: 3 MG/DL (ref 2.7–4.5)
PLATELET # BLD AUTO: 211 K/UL (ref 150–450)
PMV BLD AUTO: 10.2 FL (ref 9.2–12.9)
POTASSIUM SERPL-SCNC: 4 MMOL/L (ref 3.5–5.1)
PROT SERPL-MCNC: 6.7 G/DL (ref 6–8.4)
RBC # BLD AUTO: 3.84 M/UL (ref 4.6–6.2)
SODIUM SERPL-SCNC: 134 MMOL/L (ref 136–145)
WBC # BLD AUTO: 14.08 K/UL (ref 3.9–12.7)

## 2024-05-10 PROCEDURE — 11000001 HC ACUTE MED/SURG PRIVATE ROOM

## 2024-05-10 PROCEDURE — 25000003 PHARM REV CODE 250: Performed by: NURSE PRACTITIONER

## 2024-05-10 PROCEDURE — 83735 ASSAY OF MAGNESIUM: CPT

## 2024-05-10 PROCEDURE — 97535 SELF CARE MNGMENT TRAINING: CPT

## 2024-05-10 PROCEDURE — 63600175 PHARM REV CODE 636 W HCPCS

## 2024-05-10 PROCEDURE — 25000003 PHARM REV CODE 250: Performed by: STUDENT IN AN ORGANIZED HEALTH CARE EDUCATION/TRAINING PROGRAM

## 2024-05-10 PROCEDURE — 97166 OT EVAL MOD COMPLEX 45 MIN: CPT

## 2024-05-10 PROCEDURE — 80053 COMPREHEN METABOLIC PANEL: CPT

## 2024-05-10 PROCEDURE — 99233 SBSQ HOSP IP/OBS HIGH 50: CPT | Mod: ,,, | Performed by: INTERNAL MEDICINE

## 2024-05-10 PROCEDURE — 97530 THERAPEUTIC ACTIVITIES: CPT

## 2024-05-10 PROCEDURE — 63600175 PHARM REV CODE 636 W HCPCS: Performed by: STUDENT IN AN ORGANIZED HEALTH CARE EDUCATION/TRAINING PROGRAM

## 2024-05-10 PROCEDURE — 25000003 PHARM REV CODE 250

## 2024-05-10 PROCEDURE — 97110 THERAPEUTIC EXERCISES: CPT

## 2024-05-10 PROCEDURE — 85025 COMPLETE CBC W/AUTO DIFF WBC: CPT

## 2024-05-10 PROCEDURE — 84100 ASSAY OF PHOSPHORUS: CPT

## 2024-05-10 PROCEDURE — 97161 PT EVAL LOW COMPLEX 20 MIN: CPT

## 2024-05-10 RX ORDER — HEPARIN SODIUM 5000 [USP'U]/ML
5000 INJECTION, SOLUTION INTRAVENOUS; SUBCUTANEOUS EVERY 8 HOURS
Status: DISCONTINUED | OUTPATIENT
Start: 2024-05-10 | End: 2024-05-11 | Stop reason: HOSPADM

## 2024-05-10 RX ORDER — FAMOTIDINE 20 MG/1
20 TABLET, FILM COATED ORAL 2 TIMES DAILY
Status: DISCONTINUED | OUTPATIENT
Start: 2024-05-10 | End: 2024-05-11 | Stop reason: HOSPADM

## 2024-05-10 RX ADMIN — DOCUSATE SODIUM AND SENNOSIDES 1 TABLET: 8.6; 5 TABLET, FILM COATED ORAL at 09:05

## 2024-05-10 RX ADMIN — CEFAZOLIN 2 G: 2 INJECTION, POWDER, FOR SOLUTION INTRAMUSCULAR; INTRAVENOUS at 05:05

## 2024-05-10 RX ADMIN — DEXAMETHASONE SODIUM PHOSPHATE 4 MG: 4 INJECTION INTRA-ARTICULAR; INTRALESIONAL; INTRAMUSCULAR; INTRAVENOUS; SOFT TISSUE at 12:05

## 2024-05-10 RX ADMIN — ACETAMINOPHEN 650 MG: 325 TABLET ORAL at 08:05

## 2024-05-10 RX ADMIN — LEVETIRACETAM 500 MG: 100 INJECTION INTRAVENOUS at 08:05

## 2024-05-10 RX ADMIN — DEXAMETHASONE SODIUM PHOSPHATE 4 MG: 4 INJECTION INTRA-ARTICULAR; INTRALESIONAL; INTRAMUSCULAR; INTRAVENOUS; SOFT TISSUE at 05:05

## 2024-05-10 RX ADMIN — FAMOTIDINE 20 MG: 20 TABLET, FILM COATED ORAL at 09:05

## 2024-05-10 RX ADMIN — HEPARIN SODIUM 5000 UNITS: 5000 INJECTION INTRAVENOUS; SUBCUTANEOUS at 09:05

## 2024-05-10 RX ADMIN — LEVETIRACETAM 500 MG: 100 INJECTION INTRAVENOUS at 09:05

## 2024-05-10 RX ADMIN — HEPARIN SODIUM 5000 UNITS: 5000 INJECTION INTRAVENOUS; SUBCUTANEOUS at 02:05

## 2024-05-10 RX ADMIN — ACETAMINOPHEN 650 MG: 325 TABLET ORAL at 09:05

## 2024-05-10 RX ADMIN — ACETAMINOPHEN 650 MG: 325 TABLET ORAL at 02:05

## 2024-05-10 NOTE — PLAN OF CARE
Follow-up appointment on 5/14/24 at 9:40 am.      Stanislaw Arriaga [facility]  Case Management  [phone]

## 2024-05-10 NOTE — ASSESSMENT & PLAN NOTE
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    - Admit to NCC  - Neurosurgery following  - Neuro check and vital signs hourly  - CBC, CMP, Mag, Phos daily  - Post op CTH pending  - SBP goal < 160  - EKG pending  - Keppra q12h  - dexamethasone q6h  - cefazolin q8h for 24h  - SCDs  5/10/2024: NAEON  Stepdown to NSGY

## 2024-05-10 NOTE — ASSESSMENT & PLAN NOTE
43M s/p R occipital crani for cavernoma     CTH with expected post op changes    Plan:  Admitted NCC post op; okay for TTF to NSGY with q4h nc/vs  All labs and significant diagnostics reviewed  Dex 4q6 x3d post op  Ancef 24 hrs post op  Okay for diet/SQH   PT/OT/OOB  SBP < 160    Dispo: Anticipate home tomorrow

## 2024-05-10 NOTE — HOSPITAL COURSE
5/10: NAEON. POD 1 from R occipital crani for cavernoma. CTH with expected post op changes. Dex for 3 days. TTF to NSGY today. Exam grossly stable

## 2024-05-10 NOTE — SUBJECTIVE & OBJECTIVE
Interval History: 5/10: NAEON. POD 1 from R occipital crani for cavernoma. CTH with expected post op changes. Dex for 3 days. TTF to NSGY today. Exam grossly stable    Medications:  Continuous Infusions:   sodium chloride 0.9%   Intravenous Continuous         Scheduled Meds:   dexAMETHasone  4 mg Intravenous Q6H    famotidine  20 mg Oral BID    levETIRAcetam (Keppra) IV (PEDS and ADULTS)  500 mg Intravenous Q12H    polyethylene glycol  17 g Oral Daily    senna-docusate 8.6-50 mg  1 tablet Oral BID     PRN Meds:  Current Facility-Administered Medications:     acetaminophen, 650 mg, Oral, Q6H PRN    magnesium oxide, 800 mg, Oral, PRN    magnesium oxide, 800 mg, Oral, PRN    morphine, 2 mg, Intravenous, Q4H PRN    ondansetron, 4 mg, Intravenous, Q8H PRN    oxyCODONE, 5 mg, Oral, Q6H PRN    potassium bicarbonate, 35 mEq, Oral, PRN    potassium bicarbonate, 50 mEq, Oral, PRN    potassium bicarbonate, 60 mEq, Oral, PRN    potassium, sodium phosphates, 2 packet, Oral, PRN    potassium, sodium phosphates, 2 packet, Oral, PRN    potassium, sodium phosphates, 2 packet, Oral, PRN     Review of Systems  Objective:     Weight: 66.5 kg (146 lb 9.7 oz)  Body mass index is 22.96 kg/m².  Vital Signs (Most Recent):  Temp: 97.8 °F (36.6 °C) (05/10/24 0400)  Pulse: 100 (05/10/24 1000)  Resp: (!) 23 (05/10/24 1000)  BP: 130/84 (05/10/24 1000)  SpO2: 98 % (05/10/24 1000) Vital Signs (24h Range):  Temp:  [97.6 °F (36.4 °C)-98 °F (36.7 °C)] 97.8 °F (36.6 °C)  Pulse:  [] 100  Resp:  [13-45] 23  SpO2:  [93 %-100 %] 98 %  BP: ()/(48-84) 130/84  Arterial Line BP: (128-160)/() 156/84                                 Physical Exam     GENERAL: resting comfortably  HEENT: NCAT, PERRL, mucous membranes moist  NECK: supple, trachea midline  CV: normal capillary refill  PULM: aerating well, symmetric expansion, no distress  ABD: soft, NT, ND  EXT: no c/c/e     NEURO:     GCS 15 E4V5M6  AAO x 3  CN II-XII grossly intact except L  inferior quadrantanopsia   Fc x 4 antigravity  SILT     No drift or dysmetria      Neurosurgery Physical Exam    Significant Labs:  Recent Labs   Lab 05/09/24  1910 05/10/24  0405   * 145*    134*   K 4.1 4.0    105   CO2 22* 21*   BUN 10 11   CREATININE 0.9 0.8   CALCIUM 8.9 8.7   MG  --  2.0     Recent Labs   Lab 05/09/24  0800 05/09/24  1910 05/10/24  0405   WBC 6.49 11.64 14.08*   HGB 13.1* 13.1* 12.5*   HCT 40.8 38.8* 36.4*    234 211     Recent Labs   Lab 05/09/24  0800   INR 1.0   APTT 31.9     Microbiology Results (last 7 days)       ** No results found for the last 168 hours. **          All pertinent labs from the last 24 hours have been reviewed.    Significant Diagnostics:  I have reviewed all pertinent imaging results/findings within the past 24 hours.  I have reviewed and interpreted all pertinent imaging results/findings within the past 24 hours.  CT Head Without Contrast    Result Date: 5/9/2024  Interval operative change right occipital craniotomy and right occipital lesion resection.  Small expected postoperative gas fluid packing material and hemorrhage within the resection cavity and underlying the craniotomy. Please note there is small new area of hypoattenuation within the right parietal lobe may represent post treatment/retraction edema with small area of infarction not excluded. This could be further evaluated with MR imaging.  Ventricles relatively stable without hydrocephalus Please see above for additional details. Electronically signed by: Devonte Saeed DO Date:    05/09/2024 Time:    16:36

## 2024-05-10 NOTE — PROGRESS NOTES
Report given to MARTINE Wasserman for patient to transfer to room 960. Bed on NPU side not in room at this time. Order placed for bed

## 2024-05-10 NOTE — PROGRESS NOTES
[facility] - Neuro Critical Care  Neurosurgery  Progress Note    Subjective:     History of Present Illness: No notes on file    Post-Op Info:  Procedure(s) (LRB):  CRANIOTOMY FOR BIOPSY, WITH NEOPLASM EXCISION USING COMPUTER-ASSISTED NAVIGATION- BRAIN LAB (Right)   1 Day Post-Op   Interval History: 5/10: NAEON. POD 1 from R occipital crani for cavernoma. CTH with expected post op changes. Dex for 3 days. TTF to NSGY today. Exam grossly stable    Medications:  Continuous Infusions:   sodium chloride 0.9%   Intravenous Continuous         Scheduled Meds:   dexAMETHasone  4 mg Intravenous Q6H    famotidine  20 mg Oral BID    levETIRAcetam (Keppra) IV (PEDS and ADULTS)  500 mg Intravenous Q12H    polyethylene glycol  17 g Oral Daily    senna-docusate 8.6-50 mg  1 tablet Oral BID     PRN Meds:  Current Facility-Administered Medications:     acetaminophen, 650 mg, Oral, Q6H PRN    magnesium oxide, 800 mg, Oral, PRN    magnesium oxide, 800 mg, Oral, PRN    morphine, 2 mg, Intravenous, Q4H PRN    ondansetron, 4 mg, Intravenous, Q8H PRN    oxyCODONE, 5 mg, Oral, Q6H PRN    potassium bicarbonate, 35 mEq, Oral, PRN    potassium bicarbonate, 50 mEq, Oral, PRN    potassium bicarbonate, 60 mEq, Oral, PRN    potassium, sodium phosphates, 2 packet, Oral, PRN    potassium, sodium phosphates, 2 packet, Oral, PRN    potassium, sodium phosphates, 2 packet, Oral, PRN     Review of Systems  Objective:     Weight: 66.5 kg (146 lb 9.7 oz)  Body mass index is 22.96 kg/m².  Vital Signs (Most Recent):  Temp: 97.8 °F (36.6 °C) (05/10/24 0400)  Pulse: 100 (05/10/24 1000)  Resp: (!) 23 (05/10/24 1000)  BP: 130/84 (05/10/24 1000)  SpO2: 98 % (05/10/24 1000) Vital Signs (24h Range):  Temp:  [97.6 °F (36.4 °C)-98 °F (36.7 °C)] 97.8 °F (36.6 °C)  Pulse:  [] 100  Resp:  [13-45] 23  SpO2:  [93 %-100 %] 98 %  BP: ()/(48-84) 130/84  Arterial Line BP: (128-160)/() 156/84                                 Physical Exam     GENERAL: resting  comfortably  HEENT: NCAT, PERRL, mucous membranes moist  NECK: supple, trachea midline  CV: normal capillary refill  PULM: aerating well, symmetric expansion, no distress  ABD: soft, NT, ND  EXT: no c/c/e     NEURO:     GCS 15 E4V5M6  AAO x 3  CN II-XII grossly intact except L inferior quadrantanopsia   Fc x 4 antigravity  SILT     No drift or dysmetria      Neurosurgery Physical Exam    Significant Labs:  Recent Labs   Lab 05/09/24  1910 05/10/24  0405   * 145*    134*   K 4.1 4.0    105   CO2 22* 21*   BUN 10 11   CREATININE 0.9 0.8   CALCIUM 8.9 8.7   MG  --  2.0     Recent Labs   Lab 05/09/24  0800 05/09/24  1910 05/10/24  0405   WBC 6.49 11.64 14.08*   HGB 13.1* 13.1* 12.5*   HCT 40.8 38.8* 36.4*    234 211     Recent Labs   Lab 05/09/24  0800   INR 1.0   APTT 31.9     Microbiology Results (last 7 days)       ** No results found for the last 168 hours. **          All pertinent labs from the last 24 hours have been reviewed.    Significant Diagnostics:  I have reviewed all pertinent imaging results/findings within the past 24 hours.  I have reviewed and interpreted all pertinent imaging results/findings within the past 24 hours.  CT Head Without Contrast    Result Date: 5/9/2024  Interval operative change right occipital craniotomy and right occipital lesion resection.  Small expected postoperative gas fluid packing material and hemorrhage within the resection cavity and underlying the craniotomy. Please note there is small new area of hypoattenuation within the right parietal lobe may represent post treatment/retraction edema with small area of infarction not excluded. This could be further evaluated with MR imaging.  Ventricles relatively stable without hydrocephalus Please see above for additional details. Electronically signed by: Devonte Saeed DO Date:    05/09/2024 Time:    16:36     Assessment/Plan:     * Right occipital brain mass  43M s/p R occipital crani for cavernoma      CTH with expected post op changes    Plan:  Admitted NCC post op; okay for TTF to NSGY with q4h nc/vs  All labs and significant diagnostics reviewed  Dex 4q6 x3d post op  Ancef 24 hrs post op  Okay for diet/SQH   PT/OT/OOB  SBP < 160    Dispo: Anticipate home tomorrow          Todd Serrano MD  Neurosurgery  [facility] - Neuro Critical Care

## 2024-05-10 NOTE — PLAN OF CARE
Problem: Physical Therapy  Goal: Physical Therapy Goal  Description: No therapy indicated. See eval 5/10.  Outcome: Met

## 2024-05-10 NOTE — SUBJECTIVE & OBJECTIVE
Review of Systems    Constitutional: Denies fevers, weight loss, chills, or weakness.  Eyes: Denies changes in vision.  ENT: Denies dysphagia, nasal discharge, ear pain or discharge.  Cardiovascular: Denies chest pain, palpitations, orthopnea, or claudication.  Respiratory: Denies shortness of breath, cough, hemoptysis, or wheezing.  GI: Denies nausea/vomitting, hematochezia, melena, abd pain, or changes in appetite.  : Denies dysuria, incontinence, or hematuria.  Musculoskeletal: Denies joint pain or myalgias.  Skin/breast: Denies rashes, lumps, lesions, or discharge.  Neurologic: Denies headache, dizziness, vertigo, or paresthesias.  Psychiatric: Denies changes in mood or hallucinations.  Endocrine: Denies polyuria, polydipsia, heat/cold intolerance.  Hematologic/Lymph: Denies lymphadenopathy, easy bruising or easy bleeding.  Allergic/Immunologic: Denies rash, rhinitis.   Objective:     Vitals:  Temp: 98.3 °F (36.8 °C)  Pulse: 75  Rhythm: normal sinus rhythm  BP: 118/69  MAP (mmHg): 87  Resp: 17  SpO2: 98 %    Temp  Min: 97.8 °F (36.6 °C)  Max: 98.3 °F (36.8 °C)  Pulse  Min: 75  Max: 126  BP  Min: 80/48  Max: 148/79  MAP (mmHg)  Min: 60  Max: 103  Resp  Min: 13  Max: 45  SpO2  Min: 93 %  Max: 100 %    05/09 0701 - 05/10 0700  In: 2295 [P.O.:500; I.V.:45]  Out: 3560 [Urine:3560]   Unmeasured Output  Urine Occurrence: 1  Stool Occurrence: 1        Physical Exam  GA: Alert, comfortable, no acute distress.   HEENT: No scleral icterus or JVD.   Pulmonary: Clear to auscultation A/L.   Cardiac: RRR S1 & S2 w/o rubs/murmurs/gallops.   Abdominal: Bowel sounds present x 4. No appreciable hepatosplenomegaly.  Skin: No jaundice, rashes, or visible lesions.  Neuro:  --GCS: E4 V5 M6  --Mental Status: awake, oriented X4, follow commands  --CN II-XII grossly intact.   --Pupils 3mm, PERRL.   --Corneal reflex, gag, cough intact.  --GARCIA spont       Medications:  Continuous ScheduleddexAMETHasone, 4 mg, Q6H  famotidine, 20 mg,  BID  heparin (porcine), 5,000 Units, Q8H  levETIRAcetam (Keppra) IV (PEDS and ADULTS), 500 mg, Q12H  polyethylene glycol, 17 g, Daily  senna-docusate 8.6-50 mg, 1 tablet, BID    PRNacetaminophen, 650 mg, Q6H PRN  magnesium oxide, 800 mg, PRN  magnesium oxide, 800 mg, PRN  morphine, 2 mg, Q4H PRN  ondansetron, 4 mg, Q8H PRN  oxyCODONE, 5 mg, Q6H PRN  potassium bicarbonate, 35 mEq, PRN  potassium bicarbonate, 50 mEq, PRN  potassium bicarbonate, 60 mEq, PRN  potassium, sodium phosphates, 2 packet, PRN  potassium, sodium phosphates, 2 packet, PRN  potassium, sodium phosphates, 2 packet, PRN      Today I personally reviewed pertinent medications, lines/drains/airways, imaging, cardiology results, laboratory results, microbiology results, notably:    Diet  Diet Adult Regular (IDDSI Level 7)

## 2024-05-10 NOTE — PROGRESS NOTES
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 Admitted to [facility] for post op care and monitoring.     Hospital Course: 5/10/2024: NAEON, plan to stepdown to NSGY team         Review of Systems    Constitutional: Denies fevers, weight loss, chills, or weakness.  Eyes: Denies changes in vision.  ENT: Denies dysphagia, nasal discharge, ear pain or discharge.  Cardiovascular: Denies chest pain, palpitations, orthopnea, or claudication.  Respiratory: Denies shortness of breath, cough, hemoptysis, or wheezing.  GI: Denies nausea/vomitting, hematochezia, melena, abd pain, or changes in appetite.  : Denies dysuria, incontinence, or hematuria.  Musculoskeletal: Denies joint pain or myalgias.  Skin/breast: Denies rashes, lumps, lesions, or discharge.  Neurologic: Denies headache, dizziness, vertigo, or paresthesias.  Psychiatric: Denies changes in mood or hallucinations.  Endocrine: Denies polyuria, polydipsia, heat/cold intolerance.  Hematologic/Lymph: Denies lymphadenopathy, easy bruising or easy bleeding.  Allergic/Immunologic: Denies rash, rhinitis.   Objective:     Vitals:  Temp: 98.3 °F (36.8 °C)  Pulse: 75  Rhythm: normal sinus rhythm  BP: 118/69  MAP (mmHg): 87  Resp: 17  SpO2: 98 %    Temp  Min: 97.8 °F (36.6 °C)  Max: 98.3 °F (36.8 °C)  Pulse  Min: 75  Max: 126  BP  Min: 80/48  Max: 148/79  MAP (mmHg)  Min: 60  Max: 103  Resp  Min: 13  Max: 45  SpO2  Min: 93 %  Max: 100 %    05/09 0701 - 05/10 0700  In: 2295 [P.O.:500; I.V.:45]  Out: 3560 [Urine:3560]   Unmeasured Output  Urine Occurrence: 1  Stool Occurrence: 1        Physical Exam  GA: Alert, comfortable, no acute distress.    HEENT: No scleral icterus or JVD.   Pulmonary: Clear to auscultation A/L.   Cardiac: RRR S1 & S2 w/o rubs/murmurs/gallops.   Abdominal: Bowel sounds present x 4. No appreciable hepatosplenomegaly.  Skin: No jaundice, rashes, or visible lesions.  Neuro:  --GCS: E4 V5 M6  --Mental Status: awake, oriented X4, follow commands  --CN II-XII grossly intact.   --Pupils 3mm, PERRL.   --Corneal reflex, gag, cough intact.  --GARCIA spont       Medications:  Continuous ScheduleddexAMETHasone, 4 mg, Q6H  famotidine, 20 mg, BID  heparin (porcine), 5,000 Units, Q8H  levETIRAcetam (Keppra) IV (PEDS and ADULTS), 500 mg, Q12H  polyethylene glycol, 17 g, Daily  senna-docusate 8.6-50 mg, 1 tablet, BID    PRNacetaminophen, 650 mg, Q6H PRN  magnesium oxide, 800 mg, PRN  magnesium oxide, 800 mg, PRN  morphine, 2 mg, Q4H PRN  ondansetron, 4 mg, Q8H PRN  oxyCODONE, 5 mg, Q6H PRN  potassium bicarbonate, 35 mEq, PRN  potassium bicarbonate, 50 mEq, PRN  potassium bicarbonate, 60 mEq, PRN  potassium, sodium phosphates, 2 packet, PRN  potassium, sodium phosphates, 2 packet, PRN  potassium, sodium phosphates, 2 packet, PRN      Today I personally reviewed pertinent medications, lines/drains/airways, imaging, cardiology results, laboratory results, microbiology results, notably:    Diet  Diet Adult Regular (IDDSI Level 7)    Assessment/Plan:     Neuro  * Right occipital brain mass  43 year old male with PMHx of bipolar disorder, hepatitis B, chronic left-sided headache, left occipital cystic lesion and brain mass who presents 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    - Admit to NCC  - Neurosurgery following  - Neuro check and vital signs hourly  - CBC, CMP, Mag, Phos daily  - Post op CTH pending  - SBP goal < 160  - EKG pending  - Keppra q12h  - dexamethasone q6h  - cefazolin q8h for 24h  - SCDs  5/10/2024: NAEON  Stepdown to NSGY      GI  History of hepatitis B  History of    Other  Current every day smoker  -  on  cessation when appropriate          The patient is being Prophylaxed for:  Venous Thromboembolism with: Chemical  Stress Ulcer with: H2B  Ventilator Pneumonia with: not applicable    Activity Orders            Progressive Mobility Protocol (mobilize patient to their highest level of functioning at least twice daily) starting at 05/09 2000    Diet Adult Regular (IDDSI Level 7): Regular starting at 05/09 1246          Full Code    Brandi Mataomros NP  Neurocritical Care  [facility] - Neurosurgery ([facility])

## 2024-05-10 NOTE — PT/OT/SLP EVAL
"Physical Therapy Co-evaluation and Discharge Note with OT    Patient Name:  Teto Chong   MRN:  [id number]    *Co-treatment with OT due to patient complexity and need for two skilled therapists to ensure safe mobilization.   Recommendations:     Discharge Recommendations: No Therapy Indicated  Discharge Equipment Recommendations: none   Barriers to discharge: None    Assessment:     Teto Chong is a 43 y.o. male admitted with a medical diagnosis of Brain mass. Pt tolerated therapy well today, he was able to ambulate to the bathroom with stand-by assistance. He completed a toilet transfer and stood at the bathroom sink for ~5 minutes for ADLs during this session. At this time, patient is functioning at their prior level of function and does not require further acute PT services.     Recent Surgery: Procedure(s) (LRB):  CRANIOTOMY FOR BIOPSY, WITH NEOPLASM EXCISION USING COMPUTER-ASSISTED NAVIGATION- BRAIN LAB (Right) 1 Day Post-Op    Plan:     During this hospitalization, patient does not require further acute PT services.  Please re-consult if situation changes.      Subjective     Chief Complaint: Head pain at incision site  Patient/Family Comments/goals: "It felt good to brush my teeth."  Pain/Comfort:  Pain Rating 1: 2/10  Location - Orientation 1: posterior  Location 1: head  Pain Addressed 1: Reposition, Distraction  Pain Rating Post-Intervention 1: 2/10    Patients cultural, spiritual, Yarsanism conflicts given the current situation: no    Living Environment:  Pt lives with his mom and brother in a [facility] with no ADILENE. Bathroom set-up: Tub/Shower combo  Prior to admission, patients level of function was (I) working at a  home and driving.  Equipment used at home: none.  DME owned (not currently used): none.  Upon discharge, patient will have assistance from family.    Objective:     Communicated with rn prior to session.  Patient found HOB elevated with blood pressure cuff, telemetry, pulse ox " (continuous) upon PT entry to room.    General Precautions: Standard, fall    Orthopedic Precautions:N/A   Braces: N/A  Respiratory Status: Room air    Exams:  Cognitive Exam:  Patient is oriented to Person, Place, Time, and Situation  Gross Motor Coordination:  WFL  Sensation:    -       Intact  RLE ROM: WNL  RLE Strength: WFL  LLE ROM: WFL  LLE Strength: WFL    Functional Mobility:  Bed Mobility:     Supine to Sit: supervision  Transfers:     Sit to Stand:  supervision with no AD  Pt stood from edge of bed 4x during strength testing.  Bed to Chair: stand by assistance with no AD using  Toilet Transfer: supervision with no AD  Gait: 20' with stand by assistance and no AD  Gait belt and portable monitor utilized.  Balance:   Standing balance for ADLs ~5 min with supervision    AM-PAC 6 CLICK MOBILITY  Total Score:24       Treatment and Education:  PT assisted with functional mobility as noted above.  Discussed d/c from acute PT services, pt agreeable.  PT instructed pt to ambulate with staff at least 3x daily to prevent functional decline during hospital stay.     AM-PAC 6 CLICK MOBILITY  Total Score:24     Patient left up in chair with all lines intact, call button in reach, chair alarm on, and rn notified.    GOALS:   Multidisciplinary Problems       Physical Therapy Goals       Not on file              Multidisciplinary Problems (Resolved)          Problem: Physical Therapy    Goal Priority Disciplines Outcome Goal Variances Interventions   Physical Therapy Goal   (Resolved)     PT, PT/OT Met     Description: No therapy indicated. See eval 5/10.                       History:     Past Medical History:   Diagnosis Date    Bipolar 1 disorder     Brain mass     Hepatitis B        Past Surgical History:   Procedure Laterality Date    COLONOSCOPIC SURGICAL PROCEDURE      2014       Time Tracking:     PT Received On: 05/10/24  PT Start Time: 0848     PT Stop Time: 0920  PT Total Time (min): 32 min     Billable Minutes:  Evaluation 9, Therapeutic Activity 10, and Therapeutic Exercise 13      05/10/2024

## 2024-05-10 NOTE — NURSING
Patient Transferred to NPU Room 9 at 1300pm.       Upon arrival to the floor, patient greeted and oriented to room. Complete head to toe assessment completed per protocol. VSS, see flowsheet for details. Neuro assessment completed. Primary team notified of patient's transfer to floor. All current and transfer orders reviewed/reconciled per protocol. All emergency equipment set up in patient's room. Fall/seizure precautions initiated per providers orders. 4 Eyes skin assessment performed, see below for details. Reviewed assessment and rounding frequency with patient and family. Questions were encouraged and addressed. Repositioned patient for comfort with bed locked in lowest position, side rails up x 3, bed alarm set, and call light within reach. Instructed patient to call staff for mobility/assistance, verbalized understanding. No acute signs of distress noted at this time.           Nurses Note -- 4 Eyes      5/10/2024   1300pm      Skin assessed during: Transfer      [] No Altered Skin Integrity Present    []Prevention Measures Documented      [x] Yes- Altered Skin Integrity Present or Discovered   [x] LDA Added if Not in Epic (Describe Wound)- surgery incisions.   [] New Altered Skin Integrity was Present on Admit and Documented in LDA   [] Wound Image Taken    Wound Care Consulted? No- surgery incisions.     Attending Nurse:  MARTINE Birmingham    Second RN/Staff Member:   MARTINE Garcia

## 2024-05-10 NOTE — NURSING TRANSFER
Nursing Transfer Note      5/10/2024   12:45 PM    Nurse giving handoff:MARTINE Massey    Nurse receiving handoff:MARTINE Wasserman    Reason patient is being transferred: Stepdown from [facility]    Transfer From: [facility] 9080 >     Transfer via bed    Transfer with cardiac monitoring    Transported by Shilpa RN    Transfer Vital Signs:  Blood Pressure:107/62  Heart Rate:104  O2:98% RA  Temperature:97.9 (oral)  Respirations:18    Telemetry: Box Number [id number], Rate Sinus Tach (114), Rhythm 114, and Telemetry  Lenin  Order for Tele Monitor? Yes    Additional Lines: None    4eyes on Skin: yes    Medicines sent: Heparin SQ due at 1400    Any special needs or follow-up needed: None    Patient belongings transferred with patient: Yes    Chart send with patient: Yes    Notified: Patient called sibling    Patient reassessed at: 1300 05/10/2024    Upon arrival to floor: cardiac monitor applied, patient oriented to room, call bell in reach, and bed in lowest position

## 2024-05-10 NOTE — PT/OT/SLP EVAL
"Occupational Therapy   Evaluation and Discharge Note    Name: Teto Chong  MRN: [id number]  Admitting Diagnosis: Brain mass  Recent Surgery: Procedure(s) (LRB):  CRANIOTOMY FOR BIOPSY, WITH NEOPLASM EXCISION USING COMPUTER-ASSISTED NAVIGATION- BRAIN LAB (Right) 1 Day Post-Op    Recommendations:     Discharge Recommendations: No Therapy Indicated  Discharge Equipment Recommendations: none  Barriers to discharge:  None    Assessment:     Teto Chong is a 43 y.o. male with a medical diagnosis of Brain mass. At this time, patient is functioning at their prior level of function and does not require further acute OT services.     Plan:     During this hospitalization, patient does not require further acute OT services.  Please re-consult if situation changes.    Plan of Care Reviewed with: patient    Subjective     Chief Complaint: mild dull pain at incision site  Patient/Family Comments/goals: "I have a caricature on the back of my head from the surgeons"    Occupational Profile:  Living Environment: Patient lives in a single level home  with his mom and brother. There are no steps to enter. Bathroom setup consists of a tub/shower combo with no AD.   Previous level of function: independent  Roles and Routines: drives, works at a  home, plays bass and guitar  Equipment Used at home: none  Assistance upon Discharge: family    Pain/Comfort:  Pain Rating 1: 2/10  Location - Orientation 1: posterior  Location 1: head  Pain Addressed 1: Reposition, Distraction, Pre-medicate for activity  Pain Rating Post-Intervention 1: 2/10    Patients cultural, spiritual, Baptism conflicts given the current situation: no    Objective:     Communicated with: RN prior to session.  Patient found HOB elevated with bed alarm, pulse ox (continuous), telemetry, peripheral IV upon OT entry to room.    General Precautions: Standard, fall  Orthopedic Precautions: N/A  Braces: N/A  Respiratory Status: Room air     Occupational " Performance:    Bed Mobility:    Patient completed Scooting/Bridging with independence  Patient completed Supine to Sit with independence    Functional Mobility/Transfers:  Patient completed Sit <> Stand Transfer with supervision  with  no assistive device   Patient completed Toilet Transfer Step Transfer technique with supervision with  no AD  Functional Mobility: Pt engaging in functional mobility to simulate household/community distances approx 16'x2 with SBA and utilizing no AD in order to maximize functional activity tolerance and standing balance required for engagement in occupations of choice.      Activities of Daily Living:  Grooming: supervision to complete oral hygiene and face hygiene in stance at sink  Upper Body Dressing: independence to don gown as robe  Lower Body Dressing: independence to don socks    Cognitive/Visual Perceptual:  Cognitive/Psychosocial Skills:     -       Oriented to: Person, Place, Time, and Situation   -       Follows Commands/attention:Follows one-step commands  -       Communication: clear/fluent  -       Safety awareness/insight to disability: intact   Visual/Perceptual:      -Intact      Physical Exam:  Sensation:    -       Intact  Dominant hand:    -       Right  Upper Extremity Range of Motion:     -       Right Upper Extremity: WNL  -       Left Upper Extremity: WNL  Upper Extremity Strength:    -       Right Upper Extremity: WNL  -       Left Upper Extremity: WNL   Strength:    -       Right Upper Extremity: WNL  -       Left Upper Extremity: WNL  Fine Motor Coordination:    -       Intact  Left hand, finger to nose, Right hand, finger to nose, Left hand thumb/finger opposition skills, Right hand thumb/finger opposition skills, Left hand, manipulation of objects, and Right hand, manipulation of objects    AMPAC 6 Click ADL:  AMPAC Total Score: 24    Treatment & Education:  Pt educated on the following:  - role of OT and OT POC, including DC from OT  - use of call  light to request for assistance with all functional mobility to ensure safety during hospital stay  - importance of continued mobilization  - Safe transfer techniques and proper body mechanics for fall prevention and improved independence with functional transfers   - Importance of OOB activities to increase endurance and tolerance for increased participation in daily ADLs.    - Pt educated on importance of calling for staff assist for functional mobility/transfers while in ICU.  - All pt questions within OT scope of practice addressed, pt verbalized understanding.     Patient left up in chair with all lines intact, call button in reach, chair alarm on, and RN notified    GOALS:   Multidisciplinary Problems       Occupational Therapy Goals       Not on file              Multidisciplinary Problems (Resolved)          Problem: Occupational Therapy    Goal Priority Disciplines Outcome Interventions   Occupational Therapy Goal   (Resolved)     OT, PT/OT Met    Description: Patient will increase functional independence with ADLs by performing:    UE Dressing with Supervision.  LE Dressing with Supervision.  Grooming while standing at sink with Supervision.  Toileting from toilet with Supervision for hygiene and clothing management.   Step transfer with Supervision  Toilet transfer to toilet with Supervision.                         History:     Past Medical History:   Diagnosis Date    Bipolar 1 disorder     Brain mass     Hepatitis B          Past Surgical History:   Procedure Laterality Date    COLONOSCOPIC SURGICAL PROCEDURE      2014       Time Tracking:     OT Date of Treatment: 05/10/24  OT Start Time: 0847  OT Stop Time: 0920  OT Total Time (min): 33 min    Billable Minutes:Evaluation 13  Self Care/Home Management 10  Therapeutic Activity 10    5/10/2024

## 2024-05-11 VITALS
DIASTOLIC BLOOD PRESSURE: 75 MMHG | WEIGHT: 146.63 LBS | OXYGEN SATURATION: 96 % | SYSTOLIC BLOOD PRESSURE: 131 MMHG | HEIGHT: 67 IN | BODY MASS INDEX: 23.01 KG/M2 | RESPIRATION RATE: 18 BRPM | TEMPERATURE: 100 F | HEART RATE: 106 BPM

## 2024-05-11 LAB
ALBUMIN SERPL BCP-MCNC: 3.4 G/DL (ref 3.5–5.2)
ALP SERPL-CCNC: 66 U/L (ref 55–135)
ALT SERPL W/O P-5'-P-CCNC: 29 U/L (ref 10–44)
ANION GAP SERPL CALC-SCNC: 10 MMOL/L (ref 8–16)
AST SERPL-CCNC: 16 U/L (ref 10–40)
BASOPHILS # BLD AUTO: 0.01 K/UL (ref 0–0.2)
BASOPHILS NFR BLD: 0.1 % (ref 0–1.9)
BILIRUB SERPL-MCNC: 0.3 MG/DL (ref 0.1–1)
BUN SERPL-MCNC: 9 MG/DL (ref 6–20)
CALCIUM SERPL-MCNC: 9.2 MG/DL (ref 8.7–10.5)
CHLORIDE SERPL-SCNC: 103 MMOL/L (ref 95–110)
CO2 SERPL-SCNC: 26 MMOL/L (ref 23–29)
CREAT SERPL-MCNC: 0.8 MG/DL (ref 0.5–1.4)
DIFFERENTIAL METHOD BLD: ABNORMAL
EOSINOPHIL # BLD AUTO: 0 K/UL (ref 0–0.5)
EOSINOPHIL NFR BLD: 0 % (ref 0–8)
ERYTHROCYTE [DISTWIDTH] IN BLOOD BY AUTOMATED COUNT: 11.9 % (ref 11.5–14.5)
EST. GFR  (NO RACE VARIABLE): >60 ML/MIN/1.73 M^2
GLUCOSE SERPL-MCNC: 220 MG/DL (ref 70–110)
HCT VFR BLD AUTO: 37.5 % (ref 40–54)
HGB BLD-MCNC: 12.4 G/DL (ref 14–18)
IMM GRANULOCYTES # BLD AUTO: 0.05 K/UL (ref 0–0.04)
IMM GRANULOCYTES NFR BLD AUTO: 0.4 % (ref 0–0.5)
LYMPHOCYTES # BLD AUTO: 0.6 K/UL (ref 1–4.8)
LYMPHOCYTES NFR BLD: 4.6 % (ref 18–48)
MAGNESIUM SERPL-MCNC: 1.9 MG/DL (ref 1.6–2.6)
MCH RBC QN AUTO: 31.7 PG (ref 27–31)
MCHC RBC AUTO-ENTMCNC: 33.1 G/DL (ref 32–36)
MCV RBC AUTO: 96 FL (ref 82–98)
MONOCYTES # BLD AUTO: 0.6 K/UL (ref 0.3–1)
MONOCYTES NFR BLD: 4.1 % (ref 4–15)
NEUTROPHILS # BLD AUTO: 12.6 K/UL (ref 1.8–7.7)
NEUTROPHILS NFR BLD: 90.8 % (ref 38–73)
NRBC BLD-RTO: 0 /100 WBC
PHOSPHATE SERPL-MCNC: 2.3 MG/DL (ref 2.7–4.5)
PLATELET # BLD AUTO: 225 K/UL (ref 150–450)
PMV BLD AUTO: 11 FL (ref 9.2–12.9)
POTASSIUM SERPL-SCNC: 3.8 MMOL/L (ref 3.5–5.1)
PROT SERPL-MCNC: 6.9 G/DL (ref 6–8.4)
RBC # BLD AUTO: 3.91 M/UL (ref 4.6–6.2)
SODIUM SERPL-SCNC: 139 MMOL/L (ref 136–145)
WBC # BLD AUTO: 13.81 K/UL (ref 3.9–12.7)

## 2024-05-11 PROCEDURE — 63600175 PHARM REV CODE 636 W HCPCS: Performed by: NURSE PRACTITIONER

## 2024-05-11 PROCEDURE — 36415 COLL VENOUS BLD VENIPUNCTURE: CPT | Performed by: NURSE PRACTITIONER

## 2024-05-11 PROCEDURE — 25000003 PHARM REV CODE 250: Performed by: NURSE PRACTITIONER

## 2024-05-11 PROCEDURE — 85025 COMPLETE CBC W/AUTO DIFF WBC: CPT | Performed by: NURSE PRACTITIONER

## 2024-05-11 PROCEDURE — 83735 ASSAY OF MAGNESIUM: CPT | Performed by: NURSE PRACTITIONER

## 2024-05-11 PROCEDURE — 84100 ASSAY OF PHOSPHORUS: CPT | Performed by: NURSE PRACTITIONER

## 2024-05-11 PROCEDURE — 80053 COMPREHEN METABOLIC PANEL: CPT | Performed by: NURSE PRACTITIONER

## 2024-05-11 RX ORDER — LEVETIRACETAM 500 MG/1
500 TABLET ORAL 2 TIMES DAILY
Qty: 14 TABLET | Refills: 0 | Status: SHIPPED | OUTPATIENT
Start: 2024-05-11 | End: 2024-05-18

## 2024-05-11 RX ORDER — DEXAMETHASONE 4 MG/1
4 TABLET ORAL EVERY 6 HOURS
Qty: 4 TABLET | Refills: 0 | Status: SHIPPED | OUTPATIENT
Start: 2024-05-11 | End: 2024-05-14 | Stop reason: ALTCHOICE

## 2024-05-11 RX ADMIN — DEXAMETHASONE SODIUM PHOSPHATE 4 MG: 4 INJECTION INTRA-ARTICULAR; INTRALESIONAL; INTRAMUSCULAR; INTRAVENOUS; SOFT TISSUE at 12:05

## 2024-05-11 RX ADMIN — DEXAMETHASONE SODIUM PHOSPHATE 4 MG: 4 INJECTION INTRA-ARTICULAR; INTRALESIONAL; INTRAMUSCULAR; INTRAVENOUS; SOFT TISSUE at 06:05

## 2024-05-11 RX ADMIN — DOCUSATE SODIUM AND SENNOSIDES 1 TABLET: 8.6; 5 TABLET, FILM COATED ORAL at 09:05

## 2024-05-11 RX ADMIN — LEVETIRACETAM 500 MG: 100 INJECTION INTRAVENOUS at 10:05

## 2024-05-11 RX ADMIN — FAMOTIDINE 20 MG: 20 TABLET, FILM COATED ORAL at 09:05

## 2024-05-11 RX ADMIN — ACETAMINOPHEN 650 MG: 325 TABLET ORAL at 04:05

## 2024-05-11 RX ADMIN — POLYETHYLENE GLYCOL 3350 17 G: 17 POWDER, FOR SOLUTION ORAL at 09:05

## 2024-05-11 RX ADMIN — HEPARIN SODIUM 5000 UNITS: 5000 INJECTION INTRAVENOUS; SUBCUTANEOUS at 06:05

## 2024-05-11 NOTE — NURSING
.Nurses Note -- 4 Eyes    5/10/2024  1900      Skin assessed during: Daily Assessment      [] No Altered Skin Integrity Present    [x]Prevention Measures Documented      [x] Yes- Altered Skin Integrity Present or Discovered   [] LDA Added if Not in Epic (surgical incision back of head)   [] New Altered Skin Integrity was Present on Admit and Documented in LDA   [] Wound Image Taken    Wound Care Consulted? No    Attending Nurse:  Taisha Strong RN/Staff Member:   Vinnie

## 2024-05-11 NOTE — PLAN OF CARE
[facility] (Hospital)  Discharge Final Note    Primary Care Provider: Ana Rosa Lind MD    Expected Discharge Date: 5/11/2024    Final Discharge Note (most recent)       Final Note - 05/11/24 1022          Final Note    Assessment Type Final Discharge Note     Anticipated Discharge Disposition Home or Self Care     What phone number can be called within the next 1-3 days to see how you are doing after discharge? [phone]     Hospital Resources/Appts/Education Provided Provided patient/caregiver with written discharge plan information;Appointments scheduled and added to AVS        Post-Acute Status    Coverage Payor: [facility] - BCBS ALL OUT OF STATE -     Patient choice form signed by patient/caregiver List with quality metrics by geographic area provided     Discharge Delays None known at this time                     Important Message from Medicare             Contact Info       Ana Rosa Lind MD   Specialty: Internal Medicine   Relationship: PCP - General    [address] [place] [address]   Phone: [phone]       Next Steps: Follow up on 5/14/2024    Instructions: Follow-up appointment at 9:40 am.          Patient to discharge to home today; no needs.     [facility]  [facility]  [phone]

## 2024-05-11 NOTE — DISCHARGE INSTRUCTIONS
Patient Instructions:   --Patient stable for discharge to home   --Please take prescriptions as detailed in medication list   --All questions/concerns addressed and answered   --Please followup with neurosurgery clinic in 2 weeks on 5/21 for wound check   -Please call immediately for any new onset nausea/vomiting/fever/chills, wound breakdown, numbness/tingling/weakness     Wound Care Instructions:   -If you have staples, do not remove, as they will be removed at clinic follow up.   -You may shower daily but do not soak or submerge wound in water.   -Scalp/head incisions, wash hair daily with baby shampoo and do not use hair products. Pat incision dry, do not rub.   -For head incisions, do not wear scarfs or hats.   -Keep all wounds clean, dry, and open to air.   -Do not apply creams or ointments to the wound.   -No driving while on narcotic pain medications   -Call Neurosurgery if the wound opens, drains, or becomes red

## 2024-05-11 NOTE — NURSING
Patient discharged after op pharmacy meds acquired by family member. Discharge instructions reviewed and all questions answered.

## 2024-05-11 NOTE — PLAN OF CARE
Problem: Adult Inpatient Plan of Care  Goal: Plan of Care Review  Outcome: Progressing  Goal: Patient-Specific Goal (Individualized)  Outcome: Progressing     Problem: Infection  Goal: Absence of Infection Signs and Symptoms  Outcome: Progressing     Problem: Wound  Goal: Optimal Coping  Outcome: Progressing

## 2024-05-11 NOTE — DISCHARGE SUMMARY
Marciano Dumont - Neurosurgery ([facility])  Neurosurgery  Discharge Summary      Patient Name: Teto Chong  MRN: [id number]  Admission Date: 5/9/2024  Hospital Length of Stay: 2 days  Discharge Date and Time:  05/11/2024 8:52 AM  Attending Physician: Kendall Solis MD   Discharging Provider: Tyra Escobar MD  Primary Care Provider: Ana Rosa Lind MD    HPI:   43M s/p elective R occipital crani for cavernoma on 5/9.     Procedure(s) (LRB):  CRANIOTOMY FOR BIOPSY, WITH NEOPLASM EXCISION USING COMPUTER-ASSISTED NAVIGATION- BRAIN LAB (Right)     Hospital Course: 5/10: NAEON. POD 1 from R occipital crani for cavernoma. CTH with expected post op changes. Dex for 3 days. TTF to NSGY today. Exam grossly stable  5/11: dressing removed, pain well controlled with tylenol, ambulating without issue, stable for d/c home. Neuro intact other than LLQ VF deficit.     Goals of Care Treatment Preferences:  Code Status: Full Code      Consults: none    Significant Diagnostic Studies:   CTH Impression:     Interval operative change right occipital craniotomy and right occipital lesion resection.  Small expected postoperative gas fluid packing material and hemorrhage within the resection cavity and underlying the craniotomy.       Pending Diagnostic Studies:       Procedure Component Value Units Date/Time    CBC auto differential [[id number]] Collected: 05/11/24 0823    Order Status: Sent Lab Status: In process Updated: 05/11/24 0837    Specimen: Blood     Comprehensive metabolic panel [[id number]] Collected: 05/11/24 0823    Order Status: Sent Lab Status: In process Updated: 05/11/24 0837    Specimen: Blood     EKG, 12 - Lead [[id number]]     Order Status: Sent Lab Status: No result     Magnesium [[id number]] Collected: 05/11/24 0823    Order Status: Sent Lab Status: In process Updated: 05/11/24 0837    Specimen: Blood     Phosphorus [[id number]] Collected: 05/11/24 0823    Order Status: Sent Lab Status: In process Updated: 05/11/24 0837     Specimen: Blood     Specimen to Pathology, Surgery Neurosurgery [[id number]] Collected: 05/09/24 1220    Order Status: Sent Lab Status: In process Updated: 05/09/24 2004    Specimen: Tissue           Final Active Diagnoses:    Diagnosis Date Noted POA    PRINCIPAL PROBLEM:  Right occipital brain mass [G93.89] 04/19/2024 Yes    Current every day smoker [F17.200] 01/19/2024 Yes    History of hepatitis B [Z86.19] 01/19/2024 Yes      Problems Resolved During this Admission:      Discharged Condition: good     Disposition: Home or Self Care    Follow Up:   Follow-up Information       Ana Rosa Lind MD Follow up on 5/14/2024.    Specialty: Internal Medicine  Why: Follow-up appointment at 9:40 am.  Contact information:  [address] [place] [address] [phone]                           Patient Instructions:   --Patient stable for discharge to home   --Please take prescriptions as detailed in medication list   --All questions/concerns addressed and answered   --Please followup with neurosurgery clinic in 2 weeks on 5/21 for wound check   -Please call immediately for any new onset nausea/vomiting/fever/chills, wound breakdown, numbness/tingling/weakness     Wound Care Instructions:   -If you have staples, do not remove, as they will be removed at clinic follow up.   -You may shower daily but do not soak or submerge wound in water.   -Scalp/head incisions, wash hair daily with baby shampoo and do not use hair products. Pat incision dry, do not rub.   -For head incisions, do not wear scarfs or hats.   -Keep all wounds clean, dry, and open to air.   -Do not apply creams or ointments to the wound.   -No driving while on narcotic pain medications   -Call Neurosurgery if the wound opens, drains, or becomes red     Medications:  Reconciled Home Medications:      Medication List        START taking these medications      dexAMETHasone 4 MG Tab  Commonly known as: DECADRON  Take 1 tablet (4 mg total) by mouth every 6 (six)  hours. for 4 doses     levETIRAcetam 500 MG Tab  Commonly known as: KEPPRA  Take 1 tablet (500 mg total) by mouth 2 (two) times daily. for 7 days            STOP taking these medications      acetaminophen 325 MG tablet  Commonly known as: TYLENOL              Tyra Escobar MD  Neurosurgery  [facility] - Neurosurgery ([facility])

## 2024-05-12 RX ORDER — ROPIVACAINE HYDROCHLORIDE 5 MG/ML
INJECTION, SOLUTION EPIDURAL; INFILTRATION; PERINEURAL
Status: DISCONTINUED | OUTPATIENT
Start: 2024-05-09 | End: 2024-05-12

## 2024-05-12 NOTE — ANESTHESIA PROCEDURE NOTES
Peripheral Block    Patient location during procedure: OR   Block not for primary anesthetic.  Reason for block: at surgeon's request and post-op pain management   Post-op Pain Location: bilateral scalp   Start time: 5/9/2024 10:14 AM  Timeout: 5/9/2024 10:13 AM   End time: 5/9/2024 10:23 AM    Staffing  Authorizing Provider: Donte Tovar MD  Performing Provider: Donte Tovar MD    Staffing  Performed by: Donte Tovar MD  Authorized by: Donte Tovar MD    Preanesthetic Checklist  Completed: patient identified, IV checked, site marked, risks and benefits discussed, surgical consent, monitors and equipment checked, pre-op evaluation and timeout performed  Peripheral Block  Patient position: supine  Prep: ChloraPrep  Patient monitoring: heart rate, cardiac monitor, continuous pulse ox, continuous capnometry and frequent blood pressure checks  Block type: auriculotemporal, lesser occipital, greater occipital, supraorbital, supratrochlear and zygomaticotemporal  Laterality: bilateral  Injection technique: single shot  Needle  Needle type: Quincke   Needle gauge: 25 G  Needle length: 1.5 in  Needle localization: anatomical landmarks  Needle insertion depth: 4 cm     Assessment  Injection assessment: negative aspiration  Heart rate change: no  Slow fractionated injection: yes    Medications:    Medications: ropivacaine (NAROPIN) injection 0.5% - Perineural, Other   40 mL - 5/9/2024 10:15:00 AM    Additional Notes  With epi 1:300K

## 2024-05-12 NOTE — ANESTHESIA PROCEDURE NOTES
Arterial    Diagnosis: Brain Tumor  Doctor requesting consult: rena    Patient location during procedure: done in OR  Timeout: 5/9/2024 10:29 AM  Procedure end time: 5/9/2024 4:57 PM    Staffing  Authorizing Provider: Donte Tovar MD  Performing Provider: Donte Tovar MD    Staffing  Performed by: Donte Tovar MD  Authorized by: Donte Tovar MD    Anesthesiologist was present at the time of the procedure.    Preanesthetic Checklist  Completed: patient identified, IV checked, site marked, risks and benefits discussed, surgical consent, monitors and equipment checked, pre-op evaluation, timeout performed and anesthesia consent givenArterial  Skin Prep: chlorhexidine gluconate  Local Infiltration: none  Orientation: left  Location: radial    Catheter Size: 20 G  Catheter placement by Ultrasound guidance. Heme positive aspiration all ports.   Vessel Caliber: small, patent, compressibility normal  Vascular Doppler:  not done  Needle advanced into vessel with real time Ultrasound guidance.Insertion Attempts: 2

## 2024-05-12 NOTE — ADDENDUM NOTE
Addendum  created 05/12/24 1701 by Donte Tovar MD    Attestation recorded in Intraprocedure, Child order released for a procedure order, Clinical Note Signed, Intraprocedure Attestations filed, Intraprocedure Blocks edited, LDA created via procedure documentation, SmartForm saved

## 2024-05-13 LAB
BLD PROD TYP BPU: NORMAL
BLD PROD TYP BPU: NORMAL
BLOOD UNIT EXPIRATION DATE: NORMAL
BLOOD UNIT EXPIRATION DATE: NORMAL
BLOOD UNIT TYPE CODE: 5100
BLOOD UNIT TYPE CODE: 5100
BLOOD UNIT TYPE: NORMAL
BLOOD UNIT TYPE: NORMAL
CODING SYSTEM: NORMAL
CODING SYSTEM: NORMAL
CROSSMATCH INTERPRETATION: NORMAL
CROSSMATCH INTERPRETATION: NORMAL
DISPENSE STATUS: NORMAL
DISPENSE STATUS: NORMAL
NUM UNITS TRANS PACKED RBC: NORMAL
NUM UNITS TRANS PACKED RBC: NORMAL

## 2024-05-14 ENCOUNTER — OFFICE VISIT (OUTPATIENT)
Dept: FAMILY MEDICINE | Facility: CLINIC | Age: 44
End: 2024-05-14
Payer: COMMERCIAL

## 2024-05-14 VITALS
TEMPERATURE: 98 F | DIASTOLIC BLOOD PRESSURE: 82 MMHG | SYSTOLIC BLOOD PRESSURE: 118 MMHG | HEART RATE: 86 BPM | HEIGHT: 67 IN | BODY MASS INDEX: 24.01 KG/M2 | OXYGEN SATURATION: 98 % | WEIGHT: 153 LBS

## 2024-05-14 DIAGNOSIS — B18.1 CHRONIC VIRAL HEPATITIS B WITHOUT DELTA AGENT AND WITHOUT COMA: ICD-10-CM

## 2024-05-14 DIAGNOSIS — Z72.0 CURRENT EVERY DAY NICOTINE VAPING: ICD-10-CM

## 2024-05-14 DIAGNOSIS — G93.89 BRAIN MASS: Primary | ICD-10-CM

## 2024-05-14 PROBLEM — Z01.818 PREOPERATIVE CLEARANCE: Status: RESOLVED | Noted: 2024-04-19 | Resolved: 2024-05-14

## 2024-05-14 LAB
FINAL PATHOLOGIC DIAGNOSIS: NORMAL
GROSS: NORMAL
Lab: NORMAL
MICROSCOPIC EXAM: NORMAL

## 2024-05-14 PROCEDURE — 3074F SYST BP LT 130 MM HG: CPT | Mod: CPTII,S$GLB,, | Performed by: INTERNAL MEDICINE

## 2024-05-14 PROCEDURE — 3079F DIAST BP 80-89 MM HG: CPT | Mod: CPTII,S$GLB,, | Performed by: INTERNAL MEDICINE

## 2024-05-14 PROCEDURE — 3044F HG A1C LEVEL LT 7.0%: CPT | Mod: CPTII,S$GLB,, | Performed by: INTERNAL MEDICINE

## 2024-05-14 PROCEDURE — 3008F BODY MASS INDEX DOCD: CPT | Mod: CPTII,S$GLB,, | Performed by: INTERNAL MEDICINE

## 2024-05-14 PROCEDURE — 99999 PR PBB SHADOW E&M-EST. PATIENT-LVL III: CPT | Mod: PBBFAC,,, | Performed by: INTERNAL MEDICINE

## 2024-05-14 PROCEDURE — 99214 OFFICE O/P EST MOD 30 MIN: CPT | Mod: S$GLB,,, | Performed by: INTERNAL MEDICINE

## 2024-05-14 PROCEDURE — 1159F MED LIST DOCD IN RCRD: CPT | Mod: CPTII,S$GLB,, | Performed by: INTERNAL MEDICINE

## 2024-05-14 PROCEDURE — 1160F RVW MEDS BY RX/DR IN RCRD: CPT | Mod: CPTII,S$GLB,, | Performed by: INTERNAL MEDICINE

## 2024-05-14 PROCEDURE — 1111F DSCHRG MED/CURRENT MED MERGE: CPT | Mod: CPTII,S$GLB,, | Performed by: INTERNAL MEDICINE

## 2024-05-14 NOTE — PROGRESS NOTES
Health Maintenance Due   Topic     COVID-19 Vaccine (2 - 2023-24 season) Not offered at this office

## 2024-05-14 NOTE — ASSESSMENT & PLAN NOTE
S/p brain biopsy on 5/2/24. Pending pathology   Doing well post-operatively   Loss of left visual field, will  inform his neurosurgeon  Is following with neurosurgery, isa dyerts

## 2024-05-14 NOTE — PROGRESS NOTES
Chief Complaint: Hospital Follow Up      Teto Chong  is a 43 y.o. year old patient who presents today for hosp follow up     Had brain mas biopsy and resection on 5/9. Mass is possibly a cavernoma, pending pathology. Patient reports overall doing well. Has loss of left peripheral vision after surgery. His wound is healing well. Has left wrist pain where he had failed venipuncture. Is frustrated at the ICU nurse at the neurosurg ICU. Has now converted to e-cig.     Past Medical History:   Diagnosis Date    Bipolar 1 disorder     Brain mass     Hepatitis B        Past Surgical History:   Procedure Laterality Date    COLONOSCOPIC SURGICAL PROCEDURE      2014    CRANIOTOMY, WITH NEOPLASM EXCISION USING COMPUTER-ASSISTED NAVIGATION Right 5/9/2024    Procedure: CRANIOTOMY FOR BIOPSY, WITH NEOPLASM EXCISION USING COMPUTER-ASSISTED NAVIGATION- BRAIN LAB;  Surgeon: Kendall Solis MD;  Location: [facility] OR [address] [place] [address];  Service: Neurosurgery;  Laterality: Right;  CAVERNOMA EXCISION        Family History   Problem Relation Name Age of Onset    Hypertension Mother      Stomach cancer Father          Social History     Socioeconomic History    Marital status: Single   Tobacco Use    Smoking status: Every Day     Types: Vaping with nicotine   Substance and Sexual Activity    Alcohol use: Not Currently    Drug use: No    Sexual activity: Not Currently     Social Determinants of Health     Financial Resource Strain: High Risk (5/9/2024)    Overall Financial Resource Strain (CARDIA)     Difficulty of Paying Living Expenses: Hard   Food Insecurity: Food Insecurity Present (5/9/2024)    Hunger Vital Sign     Worried About Running Out of Food in the Last Year: Sometimes true     Ran Out of Food in the Last Year: Never true   Transportation Needs: No Transportation Needs (5/9/2024)    TRANSPORTATION NEEDS     Transportation : No   Physical Activity: Inactive (5/9/2024)    Exercise Vital Sign     Days of Exercise per Week: 0 days      Minutes of Exercise per Session: 0 min   Stress: Stress Concern Present (5/9/2024)    [employer] - Occupational Stress Questionnaire     Feeling of Stress : To some extent   Housing Stability: Low Risk  (5/9/2024)    Housing Stability Vital Sign     Unable to Pay for Housing in the Last Year: No     Homeless in the Last Year: No         Current Outpatient Medications:     levETIRAcetam (KEPPRA) 500 MG Tab, Take 1 tablet (500 mg total) by mouth 2 (two) times daily. for 7 days, Disp: 14 tablet, Rfl: 0     Review of Systems   Eyes:         Loss of left peripheral vision    Neurological:  Negative for dizziness, tingling, tremors, sensory change, speech change, focal weakness, loss of consciousness, weakness and headaches.        Objective:      Vitals:    05/14/24 0931   BP: 118/82   Pulse: 86   Temp: 98.3 °F (36.8 °C)       Physical Exam  Constitutional:       Appearance: Normal appearance.   Eyes:      General: Visual field deficit present.   Neurological:      General: No focal deficit present.      Mental Status: He is alert and oriented to person, place, and time.      Cranial Nerves: No cranial nerve deficit.      Sensory: No sensory deficit.      Motor: No weakness.      Gait: Gait normal.      Comments: Unable to see           Assessment:       1. Right occipital brain mass    2. Chronic viral hepatitis B without delta agent and without coma    3. Current every day nicotine vaping          Plan:   1. Right occipital brain mass  Assessment & Plan:  S/p brain biopsy on 5/2/24. Pending pathology   Doing well post-operatively   Loss of left visual field, will  inform his neurosurgeon  Is following with neurosurgery, has appts        2. Chronic viral hepatitis B without delta agent and without coma  Assessment & Plan:  Increased viral load   Has appt with hepatology and lab/imaging      3. Current every day nicotine vaping  Assessment & Plan:  Chantix not covered by insurance  Does not  want to join smoking cessation counseling     - counseled on cutting down on nicotine dose (<3mins)           No follow-ups on file.

## 2024-05-14 NOTE — ASSESSMENT & PLAN NOTE
Chantix not covered by insurance  Does not want to join smoking cessation counseling     - counseled on cutting down on nicotine dose (<3mins)

## 2024-05-15 ENCOUNTER — PATIENT MESSAGE (OUTPATIENT)
Dept: ADMINISTRATIVE | Facility: CLINIC | Age: 44
End: 2024-05-15
Payer: COMMERCIAL

## 2024-05-15 ENCOUNTER — PATIENT OUTREACH (OUTPATIENT)
Dept: ADMINISTRATIVE | Facility: CLINIC | Age: 44
End: 2024-05-15
Payer: COMMERCIAL

## 2024-05-15 NOTE — OP NOTE
Marciano Dumont - Neurosurgery ([facility])  Neurosurgery  Operative Note    OP Note      Date of Procedure: 5/9/2024       Pre-Operative Diagnosis: Brain mass [G93.89]    Post-Operative Diagnosis:  Vascular malformation    Anesthesia: General    Procedures performed:  Right occipital craniotomy for resection of vascular malformation with use of neuro navigation and microsurgical techniques     Surgeon: Kendall Solis MD    Assistant:: Juliana Link MD    Indication for Procedure:  This is a 43-year-old male spontaneous right occipital hemorrhage.  We worked up and found some abnormal enhancement in the area.  We wanted to remove the hematoma and explore the area for possible neoplasm versus vascular malformation.    Operative Note:  Patient was undergone preoperative neuro navigation MRI scan with fiducials.  Patient was anesthetized intubated by anesthesia.  Patient was placed onto the prone position after being placed in the [place] head pins.  The [employer] navigation system was registered using the fiducials.  We identified the optic radiation fiber tracts.  We identified hematoma in the abnormal enhancement.  The head was shaved prepped and draped in sterile fashion.  A linear incision was carried out over the right occipital area.  We dissected down.  We are able to identify the sagittal sinus as well as transverse sinus.  We placed a bur hole over each side of the sinus.  We turned a sq craniotomy over the right occipital area.  The dura was opened flipped towards the sagittal sinus.  Microscope was brought in the field.  We used microsurgical technique to split a sulcus.  Once the sulcus was split.  We dissected down through the white matter, avoiding the optic fiber tracts was best we could.  We got into the hematoma we we evacuated some of the liquid hematoma and then got into the lesion.  This point was free clear that it was a vascular malformation.  We dissected circumferentially around the vascular malformation  to we got to normal white matter.  We are able to remove the vascular malformation microsurgical technique in its entirety.  We then obtained hemostasis in the white matter.  We that is some Surgicel.  We did not have used a retractor.  We then closed the dura primarily.  We fixated the bone flap using titanium plates and screws.  Scalp was closed in layers.  A sterile dressing put in place.  Patient was taken out of [place] head pin extubated brought to the neuro ICU without any problems or complication.      EBL:  Minimal  Specimen Sent:  Vascular malformation

## 2024-05-15 NOTE — PROGRESS NOTES
C3 nurse spoke with Teto Chong for a TCC post hospital discharge follow up call. Pt states he is having some pain but he is taking PRN Tylenol and that it helps. He denies any new symptoms and confirms he was given the OOC number to call with any new or worsening symptoms.    The patient had a HOSFU with his PCP, Ana Rosa Lind MD on 5/14/24 at 0940. No messages routed at this time.

## 2024-05-15 NOTE — PROGRESS NOTES
C3 nurse attempted to contact Teto Baldemar Arlette for a TCC post hospital discharge follow up call. No answer, left voicemail with callback information.     The patient had a HOSFU with his PCP, Ana Rosa Lind MD on 5/14/24 at 0940. No messages routed at this time.

## 2024-05-21 ENCOUNTER — CLINICAL SUPPORT (OUTPATIENT)
Dept: NEUROSURGERY | Facility: CLINIC | Age: 44
End: 2024-05-21
Payer: COMMERCIAL

## 2024-05-21 DIAGNOSIS — Z98.890 S/P CRANIOTOMY: Primary | ICD-10-CM

## 2024-06-11 ENCOUNTER — OFFICE VISIT (OUTPATIENT)
Dept: NEUROSURGERY | Facility: CLINIC | Age: 44
End: 2024-06-11
Payer: COMMERCIAL

## 2024-06-11 DIAGNOSIS — Q28.3 CAVERNOUS MALFORMATION: Primary | ICD-10-CM

## 2024-06-11 PROCEDURE — 99024 POSTOP FOLLOW-UP VISIT: CPT | Mod: 95,,, | Performed by: PHYSICIAN ASSISTANT

## 2024-06-11 PROCEDURE — 3044F HG A1C LEVEL LT 7.0%: CPT | Mod: CPTII,95,, | Performed by: PHYSICIAN ASSISTANT

## 2024-06-11 NOTE — PROGRESS NOTES
The patient location is: [place]  The chief complaint leading to consultation is: post-op follow up    Visit type: audio only    Face to Face time with patient: 15  15 minutes of total time spent on the encounter, which includes face to face time and non-face to face time preparing to see the patient (eg, review of tests), Obtaining and/or reviewing separately obtained history, Documenting clinical information in the electronic or other health record, Independently interpreting results (not separately reported) and communicating results to the patient/family/caregiver, or Care coordination (not separately reported).     Each patient to whom he or she provides medical services by telemedicine is:  (1) informed of the relationship between the physician and patient and the respective role of any other health care provider with respect to management of the patient; and (2) notified that he or she may decline to receive medical services by telemedicine and may withdraw from such care at any time.    Neurosurgery  Established Patient    SUBJECTIVE:     History of Present Illness:  42 yo male who presents via virtual visit for 6 week post op follow up s/p right occipital craniotomy for resection of cavernous malformation. Post-operatively, the patient developed LLQ VF deficit which [facility] He denies worsening vision but states it has not improved. He continues with some incisional pain but denies erythema, dehiscence, fevers, chills or other signs of infection. He states he has not been sleeping well recently but denies pain as a contributing factor. His pre-op headaches have improved. He denies N/V, new weakness, slurred speech, facial weakness, seizures or other focal deficits. He is no longer requiring pain medication.     Review of patient's allergies indicates:   Allergen Reactions    Wasp sting [allergen ext-venom-honey bee] Swelling       Current Outpatient Medications   Medication Sig  Dispense Refill    levETIRAcetam (KEPPRA) 500 MG Tab Take 1 tablet (500 mg total) by mouth 2 (two) times daily. for 7 days 14 tablet 0     No current facility-administered medications for this visit.       Past Medical History:   Diagnosis Date    Bipolar 1 disorder     Brain mass     Hepatitis B      Past Surgical History:   Procedure Laterality Date    COLONOSCOPIC SURGICAL PROCEDURE      2014    CRANIOTOMY, WITH NEOPLASM EXCISION USING COMPUTER-ASSISTED NAVIGATION Right 5/9/2024    Procedure: CRANIOTOMY FOR BIOPSY, WITH NEOPLASM EXCISION USING COMPUTER-ASSISTED NAVIGATION- BRAIN LAB;  Surgeon: Kendall Solis MD;  Location: [facility] OR [address] [place] [address];  Service: Neurosurgery;  Laterality: Right;  CAVERNOMA EXCISION     Family History       Problem Relation (Age of Onset)    Hypertension Mother    Stomach cancer Father          Social History     Socioeconomic History    Marital status: Single   Tobacco Use    Smoking status: Every Day     Types: Vaping with nicotine   Substance and Sexual Activity    Alcohol use: Not Currently    Drug use: No    Sexual activity: Not Currently     Social Determinants of Health     Financial Resource Strain: High Risk (5/9/2024)    Overall Financial Resource Strain (CARDIA)     Difficulty of Paying Living Expenses: Hard   Food Insecurity: Food Insecurity Present (5/9/2024)    Hunger Vital Sign     Worried About Running Out of Food in the Last Year: Sometimes true     Ran Out of Food in the Last Year: Never true   Transportation Needs: No Transportation Needs (5/9/2024)    TRANSPORTATION NEEDS     Transportation : No   Physical Activity: Inactive (5/9/2024)    Exercise Vital Sign     Days of Exercise per Week: 0 days     Minutes of Exercise per Session: 0 min   Stress: Stress Concern Present (5/9/2024)    [employer] - Occupational Stress Questionnaire     Feeling of Stress : To some extent   Housing Stability: Low Risk  (5/9/2024)    Housing Stability Vital Sign      Unable to Pay for Housing in the Last Year: No     Homeless in the Last Year: No       Review of Systems    OBJECTIVE:     Vital Signs     There is no height or weight on file to calculate BMI.    Neurosurgery Physical Exam      Diagnostic Results:  None new     ASSESSMENT/PLAN:     42 yo male who presents via virtual visit for 6 week post op follow up s/p right occipital craniotomy for resection of cavernous malformation.I will refer ophthalmology for formal VF evaluation and plan for a post-op MRI Brain W WO contrast at 3 months.     All symptoms and signs that require emergent or urgent treatment were reviewed. The plan was discussed with the patient. All of the the patient's questions and concerns were answered and he voiced understanding. Please feel free to call with any further questions.         Dipika Carrasquillo PA-C  Neurosurgery          Note dictated with voice recognition software, please excuse any grammatical errors.

## 2024-06-14 NOTE — PROGRESS NOTES
CC: 2-week post op wound check      HPI:  Patient seen in clinic for 2 week post op s/p craniotomy for tumor biopsy with Dr. Solis 05/2/2024 . Patient denies H/A, N/V, confusion, lethargy and excessive sleepiness. Patient does still have left peripheral vision blurriness since surgery. Tylenol is controlling his incisional pain.        Posterior cranial incision removed staples, patient tolerated well, assessed, no redness, swelling, or drainage, edges well approximated.     Patient was instructed as follows:   Discontinue Bacitracin after tonight.  May shower normally but pat dry after shower.  Do not submerge wound in bath tub or go swimming until released by the physician  Keep incision clean, dry and open to air as much as possible.  Patient encouraged to walk as much as possible but advised to walk with family member or friend and rest as necessary.  No lifting >10lbs.  Avoid blood thinners for 2 more weeks.    Patient verbalized understanding of all instructions.    All questions were answered. Patient will follow up with Dr. Solis 06/11/2024.Patient was encouraged to call clinic with any future concerns prior to follow up appt. If any worsening symptoms, patient should report to ED.       Clare Jay, MSN, BSN, RN  Neurosurgery Nurse Navigator

## 2024-07-10 ENCOUNTER — OFFICE VISIT (OUTPATIENT)
Dept: OPTOMETRY | Facility: CLINIC | Age: 44
End: 2024-07-10
Payer: COMMERCIAL

## 2024-07-10 DIAGNOSIS — H52.203 MYOPIA OF BOTH EYES WITH ASTIGMATISM AND PRESBYOPIA: ICD-10-CM

## 2024-07-10 DIAGNOSIS — H52.13 MYOPIA OF BOTH EYES WITH ASTIGMATISM AND PRESBYOPIA: ICD-10-CM

## 2024-07-10 DIAGNOSIS — H52.4 MYOPIA OF BOTH EYES WITH ASTIGMATISM AND PRESBYOPIA: ICD-10-CM

## 2024-07-10 DIAGNOSIS — H53.40 VISUAL FIELD DEFECT: Primary | ICD-10-CM

## 2024-07-10 PROCEDURE — 92004 COMPRE OPH EXAM NEW PT 1/>: CPT | Mod: S$GLB,,, | Performed by: OPTOMETRIST

## 2024-07-10 PROCEDURE — 99999 PR PBB SHADOW E&M-EST. PATIENT-LVL III: CPT | Mod: PBBFAC,,, | Performed by: OPTOMETRIST

## 2024-07-10 PROCEDURE — 92015 DETERMINE REFRACTIVE STATE: CPT | Mod: S$GLB,,, | Performed by: OPTOMETRIST

## 2024-07-10 PROCEDURE — 3044F HG A1C LEVEL LT 7.0%: CPT | Mod: CPTII,S$GLB,, | Performed by: OPTOMETRIST

## 2024-07-10 PROCEDURE — 1159F MED LIST DOCD IN RCRD: CPT | Mod: CPTII,S$GLB,, | Performed by: OPTOMETRIST

## 2024-07-10 PROCEDURE — 1160F RVW MEDS BY RX/DR IN RCRD: CPT | Mod: CPTII,S$GLB,, | Performed by: OPTOMETRIST

## 2024-07-10 NOTE — PROGRESS NOTES
HPI    ARTURO: 3/28/2016 Dr. Curry  Last DFE: 3/28/2016  Chief complaint (CC): 42 yo M here for annual eye exam. Pt had crainiotomy   5/9/24. Pt reports that since surgery he has had his peripheral vision   distorted OS, can't see to the left. Must turn head to see things on the   left side.   Glasses? Yes  Contacts? No, previously yes  H/o eye surgery, injections or laser: No  H/o eye injury: No  Known eye conditions? No  Family h/o eye conditions? No  Eye gtts? No      (-) Flashes (+ )  Floaters (-) Mucous   (-)  Tearing (-) Itching (-) Burning   (+) Headaches (-) Eye Pain/discomfort (+) Irritation   (-)  Redness (-) Double vision (-) Blurry vision    Diabetic? No  A1c? Lab Results       Component                Value               Date                       HGBA1C                   5.4                 01/19/2024                 Last edited by Yonathan Green, OD on 7/10/2024  2:25 PM.            Assessment /Plan     For exam results, see Encounter Report.        Visual field defect  - s/p Craniotomy, with neoplasm excision using computer-assisted navigation (Right)  - Per pt, peripheral vision reduced since surgery on 5/9/24   - RTC next available for VF 30-2 SF OU and follow up     Myopia of both eyes with astigmatism and presbyopia   - New Spectacle Rx given, discussed different options for glasses.

## 2024-07-13 ENCOUNTER — HOSPITAL ENCOUNTER (OUTPATIENT)
Dept: RADIOLOGY | Facility: HOSPITAL | Age: 44
Discharge: HOME | End: 2024-07-13
Attending: NURSE PRACTITIONER
Payer: COMMERCIAL

## 2024-07-13 DIAGNOSIS — B18.1 CHRONIC VIRAL HEPATITIS B WITHOUT DELTA AGENT AND WITHOUT COMA: ICD-10-CM

## 2024-07-13 PROCEDURE — 76705 ECHO EXAM OF ABDOMEN: CPT | Mod: 26,,, | Performed by: RADIOLOGY

## 2024-07-13 PROCEDURE — 76705 ECHO EXAM OF ABDOMEN: CPT | Mod: TC

## 2024-07-23 ENCOUNTER — PROCEDURE VISIT (OUTPATIENT)
Dept: HEPATOLOGY | Facility: CLINIC | Age: 44
End: 2024-07-23
Payer: COMMERCIAL

## 2024-07-23 ENCOUNTER — OFFICE VISIT (OUTPATIENT)
Dept: HEPATOLOGY | Facility: CLINIC | Age: 44
End: 2024-07-23
Payer: COMMERCIAL

## 2024-07-23 VITALS — BODY MASS INDEX: 23.84 KG/M2 | WEIGHT: 151.88 LBS | HEIGHT: 67 IN

## 2024-07-23 DIAGNOSIS — B18.1 CHRONIC VIRAL HEPATITIS B WITHOUT DELTA AGENT AND WITHOUT COMA: ICD-10-CM

## 2024-07-23 DIAGNOSIS — B18.1 CHRONIC VIRAL HEPATITIS B WITHOUT DELTA AGENT AND WITHOUT COMA: Primary | ICD-10-CM

## 2024-07-23 PROCEDURE — 99214 OFFICE O/P EST MOD 30 MIN: CPT | Mod: S$GLB,,, | Performed by: NURSE PRACTITIONER

## 2024-07-23 PROCEDURE — 99999 PR PBB SHADOW E&M-EST. PATIENT-LVL III: CPT | Mod: PBBFAC,,, | Performed by: NURSE PRACTITIONER

## 2024-07-23 PROCEDURE — 3044F HG A1C LEVEL LT 7.0%: CPT | Mod: CPTII,S$GLB,, | Performed by: NURSE PRACTITIONER

## 2024-07-23 PROCEDURE — 91200 LIVER ELASTOGRAPHY: CPT | Mod: S$GLB,,, | Performed by: NURSE PRACTITIONER

## 2024-07-23 PROCEDURE — 3008F BODY MASS INDEX DOCD: CPT | Mod: CPTII,S$GLB,, | Performed by: NURSE PRACTITIONER

## 2024-07-23 PROCEDURE — 1160F RVW MEDS BY RX/DR IN RCRD: CPT | Mod: CPTII,S$GLB,, | Performed by: NURSE PRACTITIONER

## 2024-07-23 PROCEDURE — 1159F MED LIST DOCD IN RCRD: CPT | Mod: CPTII,S$GLB,, | Performed by: NURSE PRACTITIONER

## 2024-07-23 NOTE — PROGRESS NOTES
"[facility] VISIT FOLLOW UP NOTE    PCP: Ana Rosa Lind MD     CHIEF COMPLAINT: Hep B     HPI: This is a 43 y.o. patient with PMH noted below, presenting for follow up of chronic Hepatitis B      Diagnosed >10 years, unsure of when. Recalls he thought he was taking medication in the past but he stopped it ?, unsure    No family members with  Hep B that he is aware    Liver fibrosis  -- fibroscan 7/2024 noted F0, S0 (kPA 4.4, )    Interval HPI: Presents today alone. Has had chronic Hep B for many years  Parents were born in [place]  His mom and siblings have not been tested to his knowledge  He currently lives alone   Hep B DNA <2000    Labs done 7/2024 show normal transaminase levels, synthetic liver function  WNL    Lab Results   Component Value Date    ALT 27 07/13/2024    AST 20 07/13/2024    ALKPHOS 63 07/13/2024    BILITOT 0.5 07/13/2024    ALBUMIN 4.3 07/13/2024    INR 1.0 07/13/2024     07/13/2024       HCC screening:  Abd US no lesions, next due 1/2025  AFP WNL, next due 1/2025  Lab Results   Component Value Date    AFP <2.0 07/13/2024       Previous EGD : no indication     Denies family history of liver disease . Denies alcohol consumption -     Immunity to Hep A  - will discuss at next appt            Allergy and medication list reviewed and updated     PMHX:  has a past medical history of Bipolar 1 disorder, Brain mass, and Hepatitis B.    PSHX:  has a past surgical history that includes Colonoscopic surgical procedure and craniotomy, with neoplasm excision using computer-assisted navigation (Right, 5/9/2024).    FAMILY HISTORY: Updated and reviewed in EPIC    ROS:   GENERAL: Denies  fatigue  CARDIOVASCULAR: Denies edema  GI: Denies abdominal pain  SKIN: Denies rash, itching   NEURO: Denies confusion, memory loss, or mood changes    PHYSICAL EXAM:   In no acute distress; alert and oriented to person, place and time  VITALS: Ht 5' 7" (1.702 m)   Wt 68.9 kg (151 lb 14.4 oz)   BMI " 23.79 kg/m²   EYES: Sclerae anicteric  GI: Soft, non-tender, non-distended. No ascites.  EXTREMITIES:  No edema.  SKIN: Warm and dry. No jaundice. No telangectasias noted. No palmar erythema.  NEURO:  No asterixis.  PSYCH:  Thought and speech pattern appropriate. Behavior normal      EDUCATION:  See instructions discussed with patient in Instructions section of the After Visit Summary     ASSESSMENT & PLAN:  43 y.o. male with:  1. Chronic hepatitis B, diagnosed >10 years ago  -- Treatment: none currently indicated   -- Hep B DNA <2000  -- transaminases WNL  -- synthetic liver function WNL  -- immunity to Hep A : see HPI  -- risk factors for transmission : parents born in endemic country ?  -- family members or partners that need to be tested : all siblings, mom   -- Fibroscan normal, repeat in 2026/2027  -- HCC screening Q 6 months with AFP and abd. U/S - both next due 1/2025  -- Hep B counseling noted above discussed. Sexual partners and family members in household need to be tested and vaccinated if negative. Must use protection for sex (Hep B)   -- Labs and US q6 months, fibroscan q 2-3 years              Follow up in about 6 months (around 1/23/2025). with US and labs before  Orders Placed This Encounter   Procedures    US Abdomen Complete    AFP Tumor Marker    Comprehensive Metabolic Panel    Hepatitis B Surface Antigen    HEPATITIS B VIRAL DNA, QUANTITATIVE        Thank you for allowing me to participate in the care of Teto Jacksonuyen    CYDNEY Bowles    I spent a total of 30 minutes on the day of the visit.This includes face to face time and non-face to face time preparing to see the patient (eg, review of tests), obtaining and/or reviewing separately obtained history, documenting clinical information in the electronic or other health record, independently interpreting results and communicating results to the patient/family/caregiver, and coordinating care.         CC'ed note to:

## 2024-07-23 NOTE — PROCEDURES
FibroScan Transplant Hepatology(Vibration Controlled Transient Elastography)    Date/Time: 7/23/2024 1:00 PM    Performed by: Dayanara Cr NP  Authorized by: Dayanara Cr, BRIE    Diagnosis:  HBV    Probe:  M    Universal Protocol: Patient's identity, procedure and site were verified, confirmatory pause was performed.  Discussed procedure including risks and potential complications.  Questions answered.  Patient verbalizes understanding and wishes to proceed with VCTE.     Procedure: After providing explanations of the procedure, patient was placed in the supine position with right arm in maximum abduction to allow optimal exposure of right lateral abdomen.  Patient was briefly assessed, Testing was performed in the mid-axillary location, 50Hz Shear Wave pulses were applied and the resulting Shear Wave and Propagation Speed detected with a 3.5 MHz ultrasonic signal, using the FibroScan probe, Skin to liver capsule distance and liver parenchyma were accessed during the entire examination with the FibroScan probe, Patient was instructed to breathe normally and to abstain from sudden movements during the procedure, allowing for random measurements of liver stiffness. At least 10 Shear Waves were produced, Individual measurements of each Shear Wave were calculated.  Patient tolerated the procedure well with no complications.  Meets discharge criteria as was dismissed.  Rates pain 0 out of 10.  Patient will follow up with ordering provider to review results.    Findings  Median liver stiffness score:  4.4  CAP Reading: dB/m:  227    IQR/med %:  6  Interpretation  Fibrosis interpretation is based on medial liver stiffness - Kilopascal (kPa).    Fibrosis Stage:  F 0-1  Steatosis interpretation is based on controlled attenuation parameter - (dB/m).    Steatosis Grade:  <S1     [facility]  Chief Complaint Chief Complaint   Patient presents with    Consultation     New pt L ankle pain       Date 03/12/24       ASSESSMENT:  1. Arthritis of ankle, left        PLAN:  Patient was seen and evaluated today.   Patient's condition was discussed in great detail with the patient today.   Xrays were not  taken today. These were taken by her PCP, these were personally interpreted by myself discussed reviewed with the patient today.  X-ray findings show osteoarthritis of the ankle.  I am concerned about her overall structure her foot.  Picking her ankle and fusing this would not solve her flatfoot rigidity.  This patient would need a stage flatfoot reconstruction followed by ankle fusion.  I do not think she had be a good candidate for that.  However I also do not think she was a good candidate for custom bracing as she can not even bend down to put her shoes and socks on.  At some point I think she would be okay for injection.  However I am concerned about the overall structure of the talus.  I would be concerned about avascular necrosis in the presence of steroids.  I would like to hold off on any type of injections for awhile.  I did get a CT scan.    I did write a script for 1% Voltaren gel to massage on the ankle 3 2 4 times a day  Patient understands and is in agreement with the treatment plan. All questions were answered to the patients level of satisfaction.     Orders Placed This Encounter    CT ANKLE WO CONTRAST LEFT    diclofenac (VOLTAREN) 1 % gel       Johnie Trejo DPM, FACFAS, DABPM    CC:   Chief Complaint   Patient presents with    Consultation     New pt L ankle pain        HISTORY OF PRESENT ILLNESS:  Ms. Anne is a pleasant 79 year old female who presents to the clinic today with concerns of pain in her left ankle. Patient states this has been going on for months. Patient claims that she has never had much pain in the left ankle until around joey time. She states  she use to walk a lot. She has pain that shoots across the ankle and into her foot. She cannot bend down to take care of herself. She does live alone. Her pains is a 8/10.  She denies any other complaints at this time. Patient currently denies any nausea, vomiting, fever, chills or shortness of breath.    ALLERGIES:   Allergen Reactions    Cyanoacrylate [Mecrylate] RASH    Tape [Adhesive   (Environmental)] RASH       Past Medical History:   Diagnosis Date    Anxiety     BBB (bundle branch block)     [facility]-dx 2003 [facility]     Coronary artery disease of native heart with stable angina pectoris (CMD) 05/26/2020    Essential (primary) hypertension     Fracture, [place], left, closed 11/25/2020    Hyperlipidemia     Onychomycosis     Osteoporosis     Personal history of traumatic fracture     right wrist    Pneumonia 2001    PONV (postoperative nausea and vomiting)     Right sided sciatica 05/30/2013    Sacroiliac joint dysfunction of right side 05/30/2013    Skin cancer 10/30/2015    BASAL CELL CARCINOMA nasal dorsum, BBC, s/p MOHS    Spinal stenosis of lumbar region with neurogenic claudication 06/2013    L4 impingement right    Urinary incontinence        Family History   Problem Relation Age of Onset    Cancer Mother     Heart disease Father     Aneurysm Father     Hypertension Sister     Hypertension Brother     Hyperlipidemia Brother     Hypertension Brother     Hyperlipidemia Brother     Cancer, Skin Melanoma Brother         melanoma    Cancer, Colon Maternal Grandmother 61    Cancer, Breast Other         2023- NEGATIVE FAMILY HISTORY OF BREAST, OVARIAN,UTERINE,PROSTATE OR PANCREATIC CANCERS.       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, former teacher   Tobacco Use    Smoking status:  Never    Smokeless tobacco: Never   Vaping Use    Vaping Use: never used   Substance and Sexual Activity    Alcohol use: No    Drug use: No    Sexual activity: Not Currently   Other Topics Concern     Service No    Blood Transfusions No    Caffeine Concern No    Occupational Exposure No    Hobby Hazards No    Sleep Concern No    Stress Concern Yes     Comment: trying to sell house    Weight Concern No    Special Diet No    Back Care Not Asked    Exercise Yes     Comment: walking 4 x a week    Bike Helmet Not Asked    Seat Belt Yes    Self-Exams Yes   Social History Narrative    Not on file     Social Determinants of Health     Financial Resource Strain: Not on file   Food Insecurity: Not on file   Transportation Needs: Not on file   Physical Activity: Not on file   Stress: Not on file   Social Connections: Not on file   Interpersonal Safety: Not At Risk (3/28/2021)    Interpersonal Safety     Social Determinants: Intimate Partner Violence Past Fear: Not on file     Social Determinants: Intimate Partner Violence Current Fear: Not on file       Review of Systems:  Constitutional/Psyiactric: Negative for fever, chills, nausea, vomiting or unexpected weight loss  Musculoskeletal: No new muscle joint ligament or bone aches, pains, limitations or abnormalities      PHYSICAL EXAMINATION:  Visit Vitals  Ht 5' 5\" (1.651 m)   Wt 101.6 kg (224 lb)   BMI 37.28 kg/m²     General:  Well-groomed, well-dressed, no acute distress, alert and orientated x 3, mood and affect are normal   Integument:  Skin is intact, warm, dry and supple bilaterally. No open wounds.  Vascular: Dorsalis pedis 1/4 bilateral,  Posterior tibial pulses are 1/4 bilaterally, Capillary Refill time is < 3 seconds to all digits  Neurologic:  Gross sensation is intact bilaterally, Epicritic sensation is intact bilaterally, Achilles tendon reflex intact bilaterally  Musculoskeletal: there quite a bit of fat noted medial ankle and mild swelling, there is  some pain at the anterior left ankle with loss of ROM, there is significant rigidity in the left foot that is also painful          DIAGNOSTIC STUDIES:  3 views left ankle 2/28/24  - ankle arthritis noted

## 2024-08-27 ENCOUNTER — CLINICAL SUPPORT (OUTPATIENT)
Dept: OPHTHALMOLOGY | Facility: CLINIC | Age: 44
End: 2024-08-27
Payer: COMMERCIAL

## 2024-08-27 ENCOUNTER — TELEPHONE (OUTPATIENT)
Dept: OPHTHALMOLOGY | Facility: CLINIC | Age: 44
End: 2024-08-27

## 2024-08-27 ENCOUNTER — OFFICE VISIT (OUTPATIENT)
Dept: OPTOMETRY | Facility: CLINIC | Age: 44
End: 2024-08-27
Payer: COMMERCIAL

## 2024-08-27 DIAGNOSIS — H53.462 LEFT HOMONYMOUS HEMIANOPSIA: Primary | ICD-10-CM

## 2024-08-27 PROCEDURE — 92012 INTRM OPH EXAM EST PATIENT: CPT | Mod: S$GLB,,, | Performed by: OPTOMETRIST

## 2024-08-27 PROCEDURE — 1159F MED LIST DOCD IN RCRD: CPT | Mod: CPTII,S$GLB,, | Performed by: OPTOMETRIST

## 2024-08-27 PROCEDURE — 92083 EXTENDED VISUAL FIELD XM: CPT | Mod: S$GLB,,, | Performed by: OPTOMETRIST

## 2024-08-27 PROCEDURE — 3044F HG A1C LEVEL LT 7.0%: CPT | Mod: CPTII,S$GLB,, | Performed by: OPTOMETRIST

## 2024-08-27 PROCEDURE — 1160F RVW MEDS BY RX/DR IN RCRD: CPT | Mod: CPTII,S$GLB,, | Performed by: OPTOMETRIST

## 2024-08-27 PROCEDURE — 99999 PR PBB SHADOW E&M-EST. PATIENT-LVL II: CPT | Mod: PBBFAC,,, | Performed by: OPTOMETRIST

## 2024-08-27 NOTE — TELEPHONE ENCOUNTER
----- Message from Poornima Mitchell sent at 8/27/2024 10:57 AM CDT -----   referring this patient for current neurological issue / visual field defect

## 2024-08-27 NOTE — PROGRESS NOTES
HPI    ARTURO: 7/10/24 Dr. Green  Last DFE: 7/10/24  Chief complaint (CC): 42 yo M here for F review and follow up.    Glasses? Yes  Contacts? No, previously yes  H/o eye surgery, injections or laser: No  H/o eye injury: No  Known eye conditions? No  Family h/o eye conditions? No  Eye gtts? No      (-) Flashes (+ )  Floaters (-) Mucous   (-)  Tearing (-) Itching (-) Burning   (+) Headaches (-) Eye Pain/discomfort (+) Irritation   (-)  Redness (-) Double vision (-) Blurry vision    Diabetic? No  A1c? Lab Results       Component                Value               Date                       HGBA1C                   5.4                 01/19/2024            Last edited by Yonathan Green, OD on 8/27/2024 11:11 AM.            Assessment /Plan     For exam results, see Encounter Report.      Left homonymous hemianopsia  - s/p Craniotomy, with neoplasm excision using computer-assisted navigation (Right)  - Per pt, peripheral vision reduced since surgery on 5/9/24  - VF 24-2 (3/2024) OD Reliable; partial inferior arcuate defect OS Reliable; partial temporal inferior arcuate defect   - VF 30-2 today (8/27/24) Left homonymous hemianopia          - Referred to neuro-ophthalmology for baseline evaluation

## 2024-08-27 NOTE — PROGRESS NOTES
VISUAL FIELD TEST 30-2 SFAST-OU-DONE/AB  OU-REL-FIX-COOP-GOOD/AB    PT HAS NO KNOWN ALLERGIES TO LATEX OR ADHESIVES./AB      MRX: OD -5.00 +1.25 X 171            OS -4.50 +0.25 X 142

## 2024-09-10 ENCOUNTER — HOSPITAL ENCOUNTER (OUTPATIENT)
Dept: RADIOLOGY | Facility: HOSPITAL | Age: 44
Discharge: HOME | End: 2024-09-10
Attending: PHYSICIAN ASSISTANT
Payer: COMMERCIAL

## 2024-09-10 ENCOUNTER — TELEPHONE (OUTPATIENT)
Dept: NEUROSURGERY | Facility: CLINIC | Age: 44
End: 2024-09-10

## 2024-09-10 ENCOUNTER — OFFICE VISIT (OUTPATIENT)
Dept: NEUROSURGERY | Facility: CLINIC | Age: 44
End: 2024-09-10
Payer: COMMERCIAL

## 2024-09-10 DIAGNOSIS — R29.818 OTHER SYMPTOMS AND SIGNS INVOLVING THE NERVOUS SYSTEM: ICD-10-CM

## 2024-09-10 DIAGNOSIS — Q28.3 CAVERNOUS MALFORMATION: ICD-10-CM

## 2024-09-10 DIAGNOSIS — Q28.3 CAVERNOUS MALFORMATION: Primary | ICD-10-CM

## 2024-09-10 PROCEDURE — A9585 GADOBUTROL INJECTION: HCPCS | Performed by: PHYSICIAN ASSISTANT

## 2024-09-10 PROCEDURE — 3044F HG A1C LEVEL LT 7.0%: CPT | Mod: CPTII,S$GLB,, | Performed by: NEUROLOGICAL SURGERY

## 2024-09-10 PROCEDURE — 70553 MRI BRAIN STEM W/O & W/DYE: CPT | Mod: TC

## 2024-09-10 PROCEDURE — 25500020 PHARM REV CODE 255: Performed by: PHYSICIAN ASSISTANT

## 2024-09-10 PROCEDURE — 70553 MRI BRAIN STEM W/O & W/DYE: CPT | Mod: 26,,, | Performed by: RADIOLOGY

## 2024-09-10 PROCEDURE — 1159F MED LIST DOCD IN RCRD: CPT | Mod: CPTII,S$GLB,, | Performed by: NEUROLOGICAL SURGERY

## 2024-09-10 PROCEDURE — 99214 OFFICE O/P EST MOD 30 MIN: CPT | Mod: S$GLB,,, | Performed by: NEUROLOGICAL SURGERY

## 2024-09-10 PROCEDURE — 99999 PR PBB SHADOW E&M-EST. PATIENT-LVL II: CPT | Mod: PBBFAC,,, | Performed by: NEUROLOGICAL SURGERY

## 2024-09-10 RX ORDER — GADOBUTROL 604.72 MG/ML
7 INJECTION INTRAVENOUS
Status: COMPLETED | OUTPATIENT
Start: 2024-09-10 | End: 2024-09-10

## 2024-09-10 RX ADMIN — GADOBUTROL 7 ML: 604.72 INJECTION INTRAVENOUS at 08:09

## 2024-09-10 NOTE — PROGRESS NOTES
Neurosurgery  Established Patient    SCRIBE #1 NOTE: I, Kristina Gudelia, am scribing for, and in the presence of,  Kendall Solis. I have scribed the entire note.      SUBJECTIVE:     History of Present Illness:  44 y.o. male, presents for f/u after last evaluation on 6/24/2024. He has a history of right occipital cavernoma resection. He's back today with follow up imaging. The patient reports he's feeling average, but often gets tired and has to lay down. He says he has some soreness around the surgical site and occasional headaches.     Review of patient's allergies indicates:   Allergen Reactions    Wasp sting [allergen ext-venom-honey bee] Swelling     Current Outpatient Medications   Medication Sig Dispense Refill    diphenhydrAMINE-acetaminophen (TYLENOL PM)  mg Tab Take 1 tablet by mouth nightly as needed.      multivitamin with minerals tablet Take 1 tablet by mouth once daily.       No current facility-administered medications for this visit.     Past Medical History:   Diagnosis Date    Bipolar 1 disorder     Brain mass     Hepatitis B      Past Surgical History:   Procedure Laterality Date    COLONOSCOPIC SURGICAL PROCEDURE      2014    CRANIOTOMY, WITH NEOPLASM EXCISION USING COMPUTER-ASSISTED NAVIGATION Right 5/9/2024    Procedure: CRANIOTOMY FOR BIOPSY, WITH NEOPLASM EXCISION USING COMPUTER-ASSISTED NAVIGATION- BRAIN LAB;  Surgeon: Kendall Solis MD;  Location[facility] OR [address] [place] [address];  Service: Neurosurgery;  Laterality: Right;  CAVERNOMA EXCISION     Family History       Problem Relation (Age of Onset)    Hypertension Mother    Stomach cancer Father          Social History     Socioeconomic History    Marital status: Single   Tobacco Use    Smoking status: Every Day     Types: Vaping with nicotine   Substance and Sexual Activity    Alcohol use: Not Currently    Drug use: No    Sexual activity: Not Currently     Social Determinants of Health     Financial Resource Strain: High Risk (5/9/2024)    Overall  Financial Resource Strain (CARDIA)     Difficulty of Paying Living Expenses: Hard   Food Insecurity: Food Insecurity Present (5/9/2024)    Hunger Vital Sign     Worried About Running Out of Food in the Last Year: Sometimes true     Ran Out of Food in the Last Year: Never true   Transportation Needs: No Transportation Needs (5/9/2024)    TRANSPORTATION NEEDS     Transportation : No   Physical Activity: Inactive (5/9/2024)    Exercise Vital Sign     Days of Exercise per Week: 0 days     Minutes of Exercise per Session: 0 min   Stress: Stress Concern Present (5/9/2024)    [employer] - Occupational Stress Questionnaire     Feeling of Stress : To some extent   Housing Stability: Low Risk  (5/9/2024)    Housing Stability Vital Sign     Unable to Pay for Housing in the Last Year: No     Homeless in the Last Year: No     Review of Systems   All other systems reviewed and are negative.    OBJECTIVE:     Vital Signs  Pain Score:   5  There is no height or weight on file to calculate BMI.  Physical Exam:    Constitutional: He appears well-developed and well-nourished. He is not diaphoretic. No distress.     Skin:   Incision site is dry, clean, and intact.      Psych/Behavior: He is alert. He is oriented to person, place, and time. He has a normal mood and affect.     Diagnostic Results:    01) I have reviewed and independently interpreted the MRI Brain W WO Contrast from 9/10/2024: Looks good, no obvious residual or recurrence.     ASSESSMENT/PLAN:   Patient s/p resection of right occipital cavernoma. He has a mild/moderate left inferior quadrant visual deficit. Still complains of some general deconditioning and intermittent headaches. MRI scan looks good and there is no evidence of residual or recurrent cavernoma. We will widen the window of observation and see him again in 2 years. Patient also has some questions about his work status. We'll refer him for a functional capacity evaluation.          I, TOOTIE Solis, personally performed the services described in this documentation. All medical record entries made by the scribe were at my direction and in my presence. I have reviewed the chart and agree that the record reflects my personal performance and is accurate and complete.     Note dictated with voice recognition software, please excuse any grammatical errors.

## 2024-09-18 ENCOUNTER — TELEPHONE (OUTPATIENT)
Dept: NEUROSURGERY | Facility: CLINIC | Age: 44
End: 2024-09-18
Payer: COMMERCIAL

## 2024-09-18 DIAGNOSIS — Z98.890 S/P CRANIOTOMY: Primary | ICD-10-CM

## 2024-10-17 ENCOUNTER — TELEPHONE (OUTPATIENT)
Dept: FAMILY MEDICINE | Facility: CLINIC | Age: 44
End: 2024-10-17
Payer: COMMERCIAL

## 2024-10-18 ENCOUNTER — OFFICE VISIT (OUTPATIENT)
Dept: FAMILY MEDICINE | Facility: CLINIC | Age: 44
End: 2024-10-18
Payer: COMMERCIAL

## 2024-10-18 ENCOUNTER — LAB VISIT (OUTPATIENT)
Dept: LAB | Facility: HOSPITAL | Age: 44
End: 2024-10-18
Attending: INTERNAL MEDICINE
Payer: COMMERCIAL

## 2024-10-18 VITALS
BODY MASS INDEX: 23.91 KG/M2 | TEMPERATURE: 99 F | HEART RATE: 95 BPM | HEIGHT: 67 IN | SYSTOLIC BLOOD PRESSURE: 116 MMHG | OXYGEN SATURATION: 99 % | DIASTOLIC BLOOD PRESSURE: 76 MMHG | WEIGHT: 152.31 LBS

## 2024-10-18 DIAGNOSIS — R51.9 CHRONIC INTRACTABLE HEADACHE, UNSPECIFIED HEADACHE TYPE: ICD-10-CM

## 2024-10-18 DIAGNOSIS — G47.20 DISRUPTED SLEEP-WAKE CYCLE: ICD-10-CM

## 2024-10-18 DIAGNOSIS — R53.83 FATIGUE, UNSPECIFIED TYPE: Primary | ICD-10-CM

## 2024-10-18 DIAGNOSIS — F51.8 DISRUPTED SLEEP-WAKE CYCLE: ICD-10-CM

## 2024-10-18 DIAGNOSIS — R53.83 FATIGUE, UNSPECIFIED TYPE: ICD-10-CM

## 2024-10-18 DIAGNOSIS — G89.29 CHRONIC INTRACTABLE HEADACHE, UNSPECIFIED HEADACHE TYPE: ICD-10-CM

## 2024-10-18 DIAGNOSIS — H53.462 LEFT HOMONYMOUS HEMIANOPSIA: ICD-10-CM

## 2024-10-18 PROBLEM — G44.229 CHRONIC TENSION-TYPE HEADACHE, NOT INTRACTABLE: Status: RESOLVED | Noted: 2024-01-19 | Resolved: 2024-10-18

## 2024-10-18 LAB
BASOPHILS # BLD AUTO: 0.07 K/UL (ref 0–0.2)
BASOPHILS NFR BLD: 0.9 % (ref 0–1.9)
CORTIS SERPL-MCNC: 16.2 UG/DL (ref 4.3–22.4)
DIFFERENTIAL METHOD BLD: ABNORMAL
EOSINOPHIL # BLD AUTO: 0.5 K/UL (ref 0–0.5)
EOSINOPHIL NFR BLD: 6.3 % (ref 0–8)
ERYTHROCYTE [DISTWIDTH] IN BLOOD BY AUTOMATED COUNT: 11.9 % (ref 11.5–14.5)
HCT VFR BLD AUTO: 45.4 % (ref 40–54)
HGB BLD-MCNC: 15.4 G/DL (ref 14–18)
IMM GRANULOCYTES # BLD AUTO: 0.01 K/UL (ref 0–0.04)
IMM GRANULOCYTES NFR BLD AUTO: 0.1 % (ref 0–0.5)
LYMPHOCYTES # BLD AUTO: 2.1 K/UL (ref 1–4.8)
LYMPHOCYTES NFR BLD: 28.1 % (ref 18–48)
MCH RBC QN AUTO: 32 PG (ref 27–31)
MCHC RBC AUTO-ENTMCNC: 33.9 G/DL (ref 32–36)
MCV RBC AUTO: 94 FL (ref 82–98)
MONOCYTES # BLD AUTO: 0.8 K/UL (ref 0.3–1)
MONOCYTES NFR BLD: 10.9 % (ref 4–15)
NEUTROPHILS # BLD AUTO: 4 K/UL (ref 1.8–7.7)
NEUTROPHILS NFR BLD: 53.7 % (ref 38–73)
NRBC BLD-RTO: 0 /100 WBC
PLATELET # BLD AUTO: 242 K/UL (ref 150–450)
PMV BLD AUTO: 11.2 FL (ref 9.2–12.9)
RBC # BLD AUTO: 4.81 M/UL (ref 4.6–6.2)
T4 FREE SERPL-MCNC: 1.17 NG/DL (ref 0.71–1.51)
TESTOST SERPL-MCNC: 427 NG/DL (ref 304–1227)
TSH SERPL DL<=0.005 MIU/L-ACNC: 0.91 UIU/ML (ref 0.4–4)
VIT B12 SERPL-MCNC: 377 PG/ML (ref 210–950)
WBC # BLD AUTO: 7.44 K/UL (ref 3.9–12.7)

## 2024-10-18 PROCEDURE — 84403 ASSAY OF TOTAL TESTOSTERONE: CPT | Performed by: INTERNAL MEDICINE

## 2024-10-18 PROCEDURE — 82607 VITAMIN B-12: CPT | Performed by: INTERNAL MEDICINE

## 2024-10-18 PROCEDURE — 99999 PR PBB SHADOW E&M-EST. PATIENT-LVL IV: CPT | Mod: PBBFAC,,, | Performed by: INTERNAL MEDICINE

## 2024-10-18 PROCEDURE — 84443 ASSAY THYROID STIM HORMONE: CPT | Performed by: INTERNAL MEDICINE

## 2024-10-18 PROCEDURE — 84439 ASSAY OF FREE THYROXINE: CPT | Performed by: INTERNAL MEDICINE

## 2024-10-18 PROCEDURE — 36415 COLL VENOUS BLD VENIPUNCTURE: CPT | Mod: PO | Performed by: INTERNAL MEDICINE

## 2024-10-18 PROCEDURE — 82533 TOTAL CORTISOL: CPT | Performed by: INTERNAL MEDICINE

## 2024-10-18 PROCEDURE — 85025 COMPLETE CBC W/AUTO DIFF WBC: CPT | Performed by: INTERNAL MEDICINE

## 2024-10-18 RX ORDER — BUTALBITAL, ACETAMINOPHEN AND CAFFEINE 50; 325; 40 MG/1; MG/1; MG/1
1 TABLET ORAL EVERY 4 HOURS PRN
Qty: 90 TABLET | Refills: 1 | Status: SHIPPED | OUTPATIENT
Start: 2024-10-18 | End: 2024-11-17

## 2024-10-18 NOTE — ASSESSMENT & PLAN NOTE
- Evaluated sleep patterns and potential impact on daytime fatigue.  - Ordered home sleep study to evaluate sleep patterns.  - Noted that the patient reports sleeping during the day and feeling tired all the time, with irregular sleep patterns and waking up at various times in the early morning.  - Advised sleep hygiene measures: avoiding daytime naps or limiting them to 20-30 minutes, not watching TV in bed, and maintaining a consistent sleep schedule.  - Explained importance of maintaining regular sleep schedule for symptom management.  - Educated on proper napping techniques to avoid disrupting nighttime sleep.  - Recommend leaving the bedroom and returning only when sleepy if waking up in the middle of the night.  - Teto to avoid turning on lights when waking at night.  - Recommend maintaining consistent bedtime around 8:00 or 9:00 PM.

## 2024-10-18 NOTE — ASSESSMENT & PLAN NOTE
- Assessed pre & post-brain surgery symptoms, focusing on persistent headaches.  - Determined need for stronger headache medication due to inadequate relief from acetaminophen.  - Prescribed Fioricet as needed for headaches.  - Continued acetaminophen as needed for headaches.  - Noted that the patient reports daily headaches and takes acetaminophen regularly for pain management.  - Acknowledged the patient's ongoing headache symptoms despite normal MRI results.  - Noted that headache worsening with weather changes and fluorescent lighting.

## 2024-10-18 NOTE — PROGRESS NOTES
Chief Complaint: Follow-up (6 month check up), Flu Vaccine, and Dizziness      Teto Chong  is a 44 y.o. year old patient who presents today for     History of Present Illness    CHIEF COMPLAINT:  Teto presents today for follow-up after brain surgery.    POST-OPERATIVE SYMPTOMS:  He reports significant post-operative symptoms including daily headaches, fatigue, vision issues, and weakness. Headaches are exacerbated by weather changes and fluorescent lighting. Vision problems include peripheral vision loss in both eyes, particularly on the left side, requiring him to turn his head to see clearly. He experiences irregular sleep patterns, often waking between 2:00 AM and 4:00 AM, and frequently naps during the day. While the original site of his headache has improved, these new symptoms are more severe than his pre-operative condition.    MEDICATION:  He takes Tylenol daily for headaches, which provides some relief, but headaches persist daily and sometimes worsen despite regular use.    IMAGING:  A recent MRI showed no evidence of bleeding or malformation in the brain, as reported by another physician.    PREVIOUS MEDICAL CARE:  He had a follow-up with neurosurgeon Dr. Berumen in September, who indicated his job was complete following the brain surgery. He also visited an ophthalmologist who provided a new eyeglass prescription.    FUNCTIONAL STATUS:  He recently resigned from his job due to inability to maintain a regular 8:00 AM to 5:00 PM work schedule because of his symptoms. He only drives for essential tasks like grocery shopping when feeling well enough. Most of his time is spent resting at home, often lying on the living room couch during the day.    DISABILITY AND FINANCIAL CONCERNS:  He was on short-term disability for six months, which has now . He is currently in the process of applying for long-term disability and considering applying for Social Security Disability. He is paying for COBRA  insurance to maintain healthcare coverage.      ROS:  General: -fever, -chills, +fatigue, -weight gain, -weight loss  Eyes: +vision changes, -redness, -discharge  ENT: -ear pain, -nasal congestion, -sore throat  Cardiovascular: -chest pain, -palpitations, -lower extremity edema  Respiratory: -cough, -shortness of breath  Gastrointestinal: -abdominal pain, -nausea, -vomiting, -diarrhea, -constipation, -blood in stool  Genitourinary: -dysuria, -hematuria, -frequency  Musculoskeletal: -joint pain, -muscle pain  Skin: -rash, -lesion  Neurological: +headache, -dizziness, -numbness, -tingling, +weakness  Psychiatric: -anxiety, -depression, +sleep difficulty         Past Medical History:   Diagnosis Date    Bipolar 1 disorder     Brain mass     Hepatitis B        Past Surgical History:   Procedure Laterality Date    COLONOSCOPIC SURGICAL PROCEDURE      2014    CRANIOTOMY, WITH NEOPLASM EXCISION USING COMPUTER-ASSISTED NAVIGATION Right 5/9/2024    Procedure: CRANIOTOMY FOR BIOPSY, WITH NEOPLASM EXCISION USING COMPUTER-ASSISTED NAVIGATION- BRAIN LAB;  Surgeon: Kendall Solis MD;  Location: [facility] OR [address] [place] [address];  Service: Neurosurgery;  Laterality: Right;  CAVERNOMA EXCISION        Family History   Problem Relation Name Age of Onset    Hypertension Mother      Stomach cancer Father          Social History     Socioeconomic History    Marital status: Single   Tobacco Use    Smoking status: Every Day     Types: Vaping with nicotine    Smokeless tobacco: Never   Substance and Sexual Activity    Alcohol use: Not Currently    Drug use: No    Sexual activity: Not Currently     Social Drivers of Health     Financial Resource Strain: High Risk (5/9/2024)    Overall Financial Resource Strain (CARDIA)     Difficulty of Paying Living Expenses: Hard   Food Insecurity: Food Insecurity Present (5/9/2024)    Hunger Vital Sign     Worried About Running Out of Food in the Last Year: Sometimes true     Ran Out of Food in the Last Year: Never true    Transportation Needs: No Transportation Needs (5/9/2024)    TRANSPORTATION NEEDS     Transportation : No   Physical Activity: Inactive (5/9/2024)    Exercise Vital Sign     Days of Exercise per Week: 0 days     Minutes of Exercise per Session: 0 min   Stress: Stress Concern Present (5/9/2024)    [employer] [place] - Occupational Stress Questionnaire     Feeling of Stress : To some extent   Housing Stability: Low Risk  (5/9/2024)    Housing Stability Vital Sign     Unable to Pay for Housing in the Last Year: No     Homeless in the Last Year: No         Current Outpatient Medications:     acetaminophen (TYLENOL ORAL), Take by mouth., Disp: , Rfl:     multivitamin with minerals tablet, Take 1 tablet by mouth once daily., Disp: , Rfl:     butalbital-acetaminophen-caffeine -40 mg (FIORICET, ESGIC) -40 mg per tablet, Take 1 tablet by mouth every 4 (four) hours as needed for Pain., Disp: 90 tablet, Rfl: 1           Objective:      Vitals:    10/18/24 0911   BP: 116/76   Pulse: 95   Temp: 98.6 °F (37 °C)       Physical Exam  Constitutional:       Appearance: Normal appearance.      Comments: lethargic   HENT:      Head: Normocephalic and atraumatic.   Cardiovascular:      Rate and Rhythm: Normal rate.   Musculoskeletal:         General: Normal range of motion.   Skin:     General: Skin is warm and dry.   Neurological:      General: No focal deficit present.      Mental Status: He is alert and oriented to person, place, and time.      Comments: Left peripheral vision loss          Assessment:       1. Fatigue, unspecified type    2. Chronic intractable headache, unspecified headache type    3. Disrupted sleep-wake cycle    4. Left homonymous hemianopsia          Plan:   1. Fatigue, unspecified type  Assessment & Plan:  - Assessed post-brain surgery symptoms, focusing on fatigue.  - Observed that the patient looks unusually tired compared to previous visits.  - Noted that the patient  reports feeling tired and weak all the time, affecting daily activities and work.  - Recommend lifestyle changes to improve energy levels, such as maintaining a regular sleep schedule and avoiding excessive daytime napping.    Orders:  -     CORTISOL, 8AM; Future; Expected date: 10/18/2024  -     TSH; Future; Expected date: 10/18/2024  -     T4, FREE; Future; Expected date: 10/18/2024  -     CBC W/ AUTO DIFFERENTIAL; Future; Expected date: 10/18/2024  -     TESTOSTERONE; Future; Expected date: 10/18/2024  -     Home Sleep Study; Future  -     VITAMIN B12; Future; Expected date: 10/18/2024    2. Chronic intractable headache, unspecified headache type  Assessment & Plan:  - Assessed pre & post-brain surgery symptoms, focusing on persistent headaches.  - Determined need for stronger headache medication due to inadequate relief from acetaminophen.  - Prescribed Fioricet as needed for headaches.  - Continued acetaminophen as needed for headaches.  - Noted that the patient reports daily headaches and takes acetaminophen regularly for pain management.  - Acknowledged the patient's ongoing headache symptoms despite normal MRI results.  - Noted that headache worsening with weather changes and fluorescent lighting.    Orders:  -     butalbital-acetaminophen-caffeine -40 mg (FIORICET, ESGIC) -40 mg per tablet; Take 1 tablet by mouth every 4 (four) hours as needed for Pain.  Dispense: 90 tablet; Refill: 1    3. Disrupted sleep-wake cycle  Assessment & Plan:  - Evaluated sleep patterns and potential impact on daytime fatigue.  - Ordered home sleep study to evaluate sleep patterns.  - Noted that the patient reports sleeping during the day and feeling tired all the time, with irregular sleep patterns and waking up at various times in the early morning.  - Advised sleep hygiene measures: avoiding daytime naps or limiting them to 20-30 minutes, not watching TV in bed, and maintaining a consistent sleep schedule.  -  Explained importance of maintaining regular sleep schedule for symptom management.  - Educated on proper napping techniques to avoid disrupting nighttime sleep.  - Recommend leaving the bedroom and returning only when sleepy if waking up in the middle of the night.  - Teto to avoid turning on lights when waking at night.  - Recommend maintaining consistent bedtime around 8:00 or 9:00 PM.      4. Left homonymous hemianopsia  Assessment & Plan:  - Assessed post-brain surgery symptoms, focusing on vision changes.  - Noted that the patient reports loss of peripheral vision on the left side after brain surgery.  - Acknowledged that an optometrist confirmed impaired vision on the left side and provided a new prescription.  - Recognized the vision impairment and its potential for disability qualification.         DISABILITY APPLICATION:  - Provided guidance on applying for disability based on the vision impairment.  - Informed about the process of applying for Social Security Disability, including required medical documentation.  - Noted that the patient reports quitting job due to inability to maintain regular work schedule because of fatigue and sleep issues.  - Acknowledged the patient's employment issues and provided guidance on disability application.  - Offered to provide medical documentation to support disability application.    LABS:  - Recommend labs to check for potential causes of fatigue, including anemia, thyroid issues, cortisol levels, and testosterone levels.  - Considered potential causes for fatigue: anemia, thyroid dysfunction, cortisol deficiency, and testosterone imbalance.  - Ordered labs to check anemia status, thyroid function, cortisol levels, and testosterone levels.    IMAGING:  - Reviewed recent MRI showing no bleeding or malformation.  - Noted the patient's history of brain surgery performed by Dr. Berumen.  - Confirmed that recent MRI shows post-operative changes with no bleeding or  malformation.    FOLLOW UP:  - Scheduled a follow-up visit in 2 weeks to reassess the patient's condition.  - Scheduled a reassessment of the patient's condition in 2 weeks due to the severity of symptoms.  - Follow up in 2 weeks with doctor or physician assistant to reassess symptoms and medication efficacy.         Follow up in about 2 weeks (around 11/1/2024) for fatigue.    This note was generated with the assistance of ambient listening technology. Verbal consent was obtained by the patient and accompanying visitor(s) for the recording of patient appointment to facilitate this note. I attest to having reviewed and edited the generated note for accuracy, though some syntax or spelling errors may persist. Please contact the author of this note for any clarification.

## 2024-10-18 NOTE — ASSESSMENT & PLAN NOTE
- Assessed post-brain surgery symptoms, focusing on fatigue.  - Observed that the patient looks unusually tired compared to previous visits.  - Noted that the patient reports feeling tired and weak all the time, affecting daily activities and work.  - Recommend lifestyle changes to improve energy levels, such as maintaining a regular sleep schedule and avoiding excessive daytime napping.

## 2024-10-18 NOTE — ASSESSMENT & PLAN NOTE
- Assessed post-brain surgery symptoms, focusing on vision changes.  - Noted that the patient reports loss of peripheral vision on the left side after brain surgery.  - Acknowledged that an optometrist confirmed impaired vision on the left side and provided a new prescription.  - Recognized the vision impairment and its potential for disability qualification.

## 2024-10-18 NOTE — PATIENT INSTRUCTIONS
Try not to nap during the day. If you need to nap, set a timer for 20-30mins.   Do not watch TV in bed.   If you wake up in the middle of the night, get up and go to a different room. Wait till you're feeling sleepy again to go back to bed. Do not turn the lights on.

## 2024-10-21 ENCOUNTER — TELEPHONE (OUTPATIENT)
Dept: NEUROSURGERY | Facility: CLINIC | Age: 44
End: 2024-10-21
Payer: COMMERCIAL

## 2024-10-21 NOTE — TELEPHONE ENCOUNTER
Unable to get in contact with Laurie. Faxed paper to number listed stating that all medical records request must be sent to the Medical Records Department.    ----- Message from Clare sent at 10/21/2024 11:54 AM CDT -----  Regarding: office notes  Contact: [phone] ext [phone]  Caller Laurie calling in with [facility] requesting office notes for  apt on 9/10/24 fax number [phone]

## 2024-10-22 ENCOUNTER — PATIENT MESSAGE (OUTPATIENT)
Dept: RESEARCH | Facility: HOSPITAL | Age: 44
End: 2024-10-22
Payer: COMMERCIAL

## 2024-10-23 ENCOUNTER — PATIENT MESSAGE (OUTPATIENT)
Dept: FAMILY MEDICINE | Facility: CLINIC | Age: 44
End: 2024-10-23
Payer: COMMERCIAL

## 2024-10-31 ENCOUNTER — OFFICE VISIT (OUTPATIENT)
Dept: FAMILY MEDICINE | Facility: CLINIC | Age: 44
End: 2024-10-31
Payer: COMMERCIAL

## 2024-10-31 VITALS
TEMPERATURE: 98 F | BODY MASS INDEX: 23.63 KG/M2 | OXYGEN SATURATION: 97 % | HEART RATE: 99 BPM | DIASTOLIC BLOOD PRESSURE: 70 MMHG | WEIGHT: 150.56 LBS | HEIGHT: 67 IN | SYSTOLIC BLOOD PRESSURE: 108 MMHG | RESPIRATION RATE: 16 BRPM

## 2024-10-31 DIAGNOSIS — Z09 FOLLOW-UP EXAM: Primary | ICD-10-CM

## 2024-10-31 DIAGNOSIS — G93.89 BRAIN MASS: ICD-10-CM

## 2024-10-31 DIAGNOSIS — R51.9 CHRONIC INTRACTABLE HEADACHE, UNSPECIFIED HEADACHE TYPE: ICD-10-CM

## 2024-10-31 DIAGNOSIS — G89.29 CHRONIC INTRACTABLE HEADACHE, UNSPECIFIED HEADACHE TYPE: ICD-10-CM

## 2024-10-31 DIAGNOSIS — Z23 INFLUENZA VACCINE NEEDED: ICD-10-CM

## 2024-10-31 PROCEDURE — 99999 PR PBB SHADOW E&M-EST. PATIENT-LVL III: CPT | Mod: PBBFAC,,,

## 2024-11-05 ENCOUNTER — PATIENT MESSAGE (OUTPATIENT)
Dept: RESEARCH | Facility: HOSPITAL | Age: 44
End: 2024-11-05
Payer: COMMERCIAL

## 2024-12-11 ENCOUNTER — TELEPHONE (OUTPATIENT)
Dept: OPHTHALMOLOGY | Facility: CLINIC | Age: 44
End: 2024-12-11
Payer: COMMERCIAL

## 2024-12-17 ENCOUNTER — TELEPHONE (OUTPATIENT)
Dept: NEUROSURGERY | Facility: CLINIC | Age: 44
End: 2024-12-17
Payer: COMMERCIAL

## 2024-12-17 NOTE — TELEPHONE ENCOUNTER
----- Message from Carlosbenjamínava sent at 12/17/2024  1:07 PM CST -----  Regarding: Callback  Contact: Lyla/[employer] [phone] ext. [phone]  Lyla calling from [employer] requesting a callback from nurse or provider in regards to finding out if the patient had the FCE test yet. Please call back as soon as possible.    Fax: [phone]

## 2025-01-10 RX ORDER — BUTALBITAL, ACETAMINOPHEN, CAFFEINE AND CODEINE PHOSPHATE 50; 325; 40; 30 MG/1; MG/1; MG/1; MG/1
CAPSULE ORAL EVERY 4 HOURS
Status: CANCELLED | OUTPATIENT
Start: 2025-01-10

## 2025-01-10 NOTE — TELEPHONE ENCOUNTER
No care due was identified.  [facility] Embedded Care Due Messages. Reference number: [id number].   1/10/2025 12:25:02 PM CST

## 2025-01-13 DIAGNOSIS — G89.29 CHRONIC INTRACTABLE HEADACHE, UNSPECIFIED HEADACHE TYPE: Primary | ICD-10-CM

## 2025-01-13 DIAGNOSIS — R51.9 CHRONIC INTRACTABLE HEADACHE, UNSPECIFIED HEADACHE TYPE: Primary | ICD-10-CM

## 2025-01-13 RX ORDER — BUTALBITAL, ACETAMINOPHEN AND CAFFEINE 50; 325; 40 MG/1; MG/1; MG/1
1 TABLET ORAL EVERY 4 HOURS PRN
Qty: 90 TABLET | Refills: 1 | Status: SHIPPED | OUTPATIENT
Start: 2025-01-13 | End: 2025-02-12

## 2025-01-13 NOTE — TELEPHONE ENCOUNTER
----- Message from Celine sent at 1/13/2025  2:11 PM CST -----  Regarding: Refill Request  Type:  RX Refill Request    Who Called: Teto   Refill or New Rx:Refill   RX Name and Strength:butalbital-acetaminophen-caffeine -40 mg (FIORICET, ESGIC) -40 mg per tablet  How is the patient currently taking it? (ex. 1XDay):  Is this a 30 day or 90 day RX:  [facility] with [id number] @ [facility] [address] [place] [address] AT [facility] [address] [facility]   Phone: [phone]  Fax: [phone]  Local or Mail Order:Local   Ordering Provider:Diogo   Would the patient rather a call back or a response via [facility]? Call Back   Best Call Back Number:[phone]  Additional Information:

## 2025-01-13 NOTE — TELEPHONE ENCOUNTER
No care due was identified.  [facility] Embedded Care Due Messages. Reference number: [id number].   1/13/2025 2:16:55 PM CST

## 2025-01-24 ENCOUNTER — LAB VISIT (OUTPATIENT)
Dept: LAB | Facility: HOSPITAL | Age: 45
End: 2025-01-24
Attending: NURSE PRACTITIONER
Payer: COMMERCIAL

## 2025-01-24 DIAGNOSIS — Z09 FOLLOW-UP EXAM: ICD-10-CM

## 2025-01-24 LAB
ALBUMIN SERPL BCP-MCNC: 4.1 G/DL (ref 3.5–5.2)
ALP SERPL-CCNC: 65 U/L (ref 40–150)
ALT SERPL W/O P-5'-P-CCNC: 34 U/L (ref 10–44)
ANION GAP SERPL CALC-SCNC: 7 MMOL/L (ref 8–16)
AST SERPL-CCNC: 22 U/L (ref 10–40)
BASOPHILS # BLD AUTO: 0.08 K/UL (ref 0–0.2)
BASOPHILS NFR BLD: 1 % (ref 0–1.9)
BILIRUB SERPL-MCNC: 0.3 MG/DL (ref 0.1–1)
BUN SERPL-MCNC: 9 MG/DL (ref 6–20)
CALCIUM SERPL-MCNC: 9.1 MG/DL (ref 8.7–10.5)
CHLORIDE SERPL-SCNC: 103 MMOL/L (ref 95–110)
CHOLEST SERPL-MCNC: 190 MG/DL (ref 120–199)
CHOLEST/HDLC SERPL: 4.8 {RATIO} (ref 2–5)
CO2 SERPL-SCNC: 27 MMOL/L (ref 23–29)
CREAT SERPL-MCNC: 0.9 MG/DL (ref 0.5–1.4)
DIFFERENTIAL METHOD BLD: ABNORMAL
EOSINOPHIL # BLD AUTO: 0.9 K/UL (ref 0–0.5)
EOSINOPHIL NFR BLD: 11.9 % (ref 0–8)
ERYTHROCYTE [DISTWIDTH] IN BLOOD BY AUTOMATED COUNT: 11.5 % (ref 11.5–14.5)
EST. GFR  (NO RACE VARIABLE): >60 ML/MIN/1.73 M^2
ESTIMATED AVG GLUCOSE: 108 MG/DL (ref 68–131)
GLUCOSE SERPL-MCNC: 93 MG/DL (ref 70–110)
HBA1C MFR BLD: 5.4 % (ref 4–5.6)
HCT VFR BLD AUTO: 42.8 % (ref 40–54)
HDLC SERPL-MCNC: 40 MG/DL (ref 40–75)
HDLC SERPL: 21.1 % (ref 20–50)
HGB BLD-MCNC: 14.2 G/DL (ref 14–18)
IMM GRANULOCYTES # BLD AUTO: 0.03 K/UL (ref 0–0.04)
IMM GRANULOCYTES NFR BLD AUTO: 0.4 % (ref 0–0.5)
LDLC SERPL CALC-MCNC: 118.2 MG/DL (ref 63–159)
LYMPHOCYTES # BLD AUTO: 2.3 K/UL (ref 1–4.8)
LYMPHOCYTES NFR BLD: 29.7 % (ref 18–48)
MCH RBC QN AUTO: 31.4 PG (ref 27–31)
MCHC RBC AUTO-ENTMCNC: 33.2 G/DL (ref 32–36)
MCV RBC AUTO: 95 FL (ref 82–98)
MONOCYTES # BLD AUTO: 0.9 K/UL (ref 0.3–1)
MONOCYTES NFR BLD: 11.1 % (ref 4–15)
NEUTROPHILS # BLD AUTO: 3.6 K/UL (ref 1.8–7.7)
NEUTROPHILS NFR BLD: 45.9 % (ref 38–73)
NONHDLC SERPL-MCNC: 150 MG/DL
NRBC BLD-RTO: 0 /100 WBC
PLATELET # BLD AUTO: 233 K/UL (ref 150–450)
PMV BLD AUTO: 11.3 FL (ref 9.2–12.9)
POTASSIUM SERPL-SCNC: 4.1 MMOL/L (ref 3.5–5.1)
PROT SERPL-MCNC: 7.5 G/DL (ref 6–8.4)
RBC # BLD AUTO: 4.52 M/UL (ref 4.6–6.2)
SODIUM SERPL-SCNC: 137 MMOL/L (ref 136–145)
TRIGL SERPL-MCNC: 159 MG/DL (ref 30–150)
WBC # BLD AUTO: 7.81 K/UL (ref 3.9–12.7)

## 2025-01-24 PROCEDURE — 36415 COLL VENOUS BLD VENIPUNCTURE: CPT | Mod: PO

## 2025-01-24 PROCEDURE — 85025 COMPLETE CBC W/AUTO DIFF WBC: CPT

## 2025-01-24 PROCEDURE — 80053 COMPREHEN METABOLIC PANEL: CPT

## 2025-01-24 PROCEDURE — 83036 HEMOGLOBIN GLYCOSYLATED A1C: CPT

## 2025-01-24 PROCEDURE — 80061 LIPID PANEL: CPT

## 2025-01-25 ENCOUNTER — HOSPITAL ENCOUNTER (OUTPATIENT)
Dept: RADIOLOGY | Facility: HOSPITAL | Age: 45
Discharge: HOME | End: 2025-01-25
Attending: NURSE PRACTITIONER
Payer: COMMERCIAL

## 2025-01-25 DIAGNOSIS — B18.1 CHRONIC VIRAL HEPATITIS B WITHOUT DELTA AGENT AND WITHOUT COMA: ICD-10-CM

## 2025-01-25 PROCEDURE — 76700 US EXAM ABDOM COMPLETE: CPT | Mod: TC

## 2025-01-25 PROCEDURE — 76700 US EXAM ABDOM COMPLETE: CPT | Mod: 26,,, | Performed by: RADIOLOGY

## 2025-01-27 ENCOUNTER — OFFICE VISIT (OUTPATIENT)
Dept: FAMILY MEDICINE | Facility: CLINIC | Age: 45
End: 2025-01-27
Payer: COMMERCIAL

## 2025-01-27 VITALS
RESPIRATION RATE: 16 BRPM | WEIGHT: 156.75 LBS | OXYGEN SATURATION: 97 % | DIASTOLIC BLOOD PRESSURE: 60 MMHG | HEIGHT: 67 IN | SYSTOLIC BLOOD PRESSURE: 114 MMHG | HEART RATE: 97 BPM | BODY MASS INDEX: 24.6 KG/M2 | TEMPERATURE: 99 F

## 2025-01-27 DIAGNOSIS — Z23 NEED FOR COVID-19 VACCINE: ICD-10-CM

## 2025-01-27 DIAGNOSIS — R51.9 CHRONIC INTRACTABLE HEADACHE, UNSPECIFIED HEADACHE TYPE: ICD-10-CM

## 2025-01-27 DIAGNOSIS — D49.6 BRAIN TUMOR: ICD-10-CM

## 2025-01-27 DIAGNOSIS — F31.9 BIPOLAR 1 DISORDER: ICD-10-CM

## 2025-01-27 DIAGNOSIS — Z09 FOLLOW-UP EXAM: Primary | ICD-10-CM

## 2025-01-27 DIAGNOSIS — G89.29 CHRONIC INTRACTABLE HEADACHE, UNSPECIFIED HEADACHE TYPE: ICD-10-CM

## 2025-01-27 DIAGNOSIS — B18.1 CHRONIC VIRAL HEPATITIS B WITHOUT DELTA AGENT AND WITHOUT COMA: ICD-10-CM

## 2025-01-27 PROCEDURE — 3008F BODY MASS INDEX DOCD: CPT | Mod: CPTII,S$GLB,,

## 2025-01-27 PROCEDURE — 1160F RVW MEDS BY RX/DR IN RCRD: CPT | Mod: CPTII,S$GLB,,

## 2025-01-27 PROCEDURE — 3078F DIAST BP <80 MM HG: CPT | Mod: CPTII,S$GLB,,

## 2025-01-27 PROCEDURE — 3074F SYST BP LT 130 MM HG: CPT | Mod: CPTII,S$GLB,,

## 2025-01-27 PROCEDURE — 1159F MED LIST DOCD IN RCRD: CPT | Mod: CPTII,S$GLB,,

## 2025-01-27 PROCEDURE — 3044F HG A1C LEVEL LT 7.0%: CPT | Mod: CPTII,S$GLB,,

## 2025-01-27 PROCEDURE — 91320 SARSCV2 VAC 30MCG TRS-SUC IM: CPT | Mod: S$GLB,,,

## 2025-01-27 PROCEDURE — 99214 OFFICE O/P EST MOD 30 MIN: CPT | Mod: S$GLB,,,

## 2025-01-27 PROCEDURE — 90480 ADMN SARSCOV2 VAC 1/ONLY CMP: CPT | Mod: S$GLB,,,

## 2025-01-27 PROCEDURE — 99999 PR PBB SHADOW E&M-EST. PATIENT-LVL III: CPT | Mod: PBBFAC,,,

## 2025-01-27 NOTE — PROGRESS NOTES
HPI     Chief Complaint:  Chief Complaint   Patient presents with    Follow-up       Teto Chong is a 44 y.o. male with multiple medical diagnoses as listed in the medical history and problem list that presents for Follow-up     HPI    History of Present Illness    CHIEF COMPLAINT:  Teto presents today for follow up of post-brain surgery symptoms.    POST-SURGICAL HEADACHE:  He experiences nocturnal headaches with varying location, currently on the left side of head with intensity of 3-4/10. The headache is accompanied by ear aching and tingling sensations. While pain medication provides some relief making it tolerable, it does not completely resolve symptoms.    VISUAL CHANGES:  He reports left-sided peripheral vision loss, particularly in the lower left quadrant, with some blurriness in the upper left visual field. Right-sided vision remains normal.    MEDICATIONS:  He takes prescribed pain medication every 4 hours as needed, with current prescription lasting approximately three months instead of intended one month due to conservative usage and supplementation with OTC pain relievers. He also takes multivitamins regularly.    LABS:  Chemistry panel and electrolytes were normal. Cholesterol was normal. Triglycerides were mildly elevated at 9 points above reference range. He drinks alcohol less than monthly.      ROS:  General: -fever, -chills, -fatigue, -weight gain, -weight loss  Eyes: +vision changes, -redness, -discharge  ENT: -ear pain, -nasal congestion, -sore throat  Cardiovascular: -chest pain, -palpitations, -lower extremity edema  Respiratory: -cough, -shortness of breath  Gastrointestinal: -abdominal pain, -nausea, -vomiting, -diarrhea, -constipation, -blood in stool  Genitourinary: -dysuria, -hematuria, -frequency  Musculoskeletal: -joint pain, -muscle pain  Skin: -rash, -lesion  Neurological: +headache, -dizziness, -numbness, -tingling  Psychiatric: -anxiety, -depression, -sleep difficulty              10/31/2024 Follow-up, my note  HPI     History of Present Illness    CHIEF COMPLAINT:  Teto presents today for follow-up after surgery for a head mass.     POST-SURGICAL SYMPTOMS:  He reports experiencing headache, fatigue, dizziness, and visual symptoms post-surgery. The headache pain level varies daily, currently ranging from 2-4 out of 10. He describes the headache as throbbing but states it is more bearable than before the surgery. He mostly stays at home, expressing difficulty functioning normally and inability to perform typical daily activities.     MEDICATIONS:  He reports being prescribed Fioricet by Dr. Lind to help with headaches. The medication has been helpful in managing his headache symptoms but causes drowsiness. He is taking it as needed. He also mentions taking Tylenol for symptom management, expressing uncertainty about whether to continue using it alongside Fioricet.     SOCIAL HISTORY:  He lives with his mother, who is a senior citizen.     SUPPLEMENTS:  He reports taking a daily multivitamin supplement for several years.                        Assessment & Plan      Reviewed patient's post-surgical symptoms including headache, fatigue, and dizziness  Assessed effectiveness of new medication (Fioricet) prescribed by Dr. Lind for symptom management  Evaluated current pain level and coping strategies  Considered patient's recent normal lab results, including B12 levels, in relation to fatigue  Confirmed no concerning changes in vision, skin, or swelling        NUTRITIONAL SUPPLEMENTATION:  - Discussed benefits of multivitamin supplementation, including B12, iron, and folic acid content.  - Teto to continue taking daily multivitamin.     COVID-19 EDUCATION:  - Provided information on current COVID-19 situation and importance of vaccination.     FLU VACCINATION:  - Administered flu vaccine in office.     FOLLOW UP:  - Follow up in January after ultrasound and hepatology visit.  - Labs to  be done at January follow-up visit.          Problem List Items Addressed This Visit         Right occipital brain mass  POST-OPERATIVE CARE:  - Explained that post-surgical healing and symptom resolution varies for each individual.        Chronic intractable headache  HEADACHE:  - Continued Fioricet (butalbital-acetaminophen-caffeine) as needed for headache, with instruction to take before bedtime if needed.     PAIN MANAGEMENT:  - Continued OTC Tylenol (acetaminophen) as needed for pain management.        Influenza vaccine needed     Relevant Medications     influenza (Afluria) 45 mcg/0.5 mL IM vaccine (> or = 36 mo) 0.5 mL (Completed)      Other Visit Diagnoses         Follow-up exam    -  Primary     Relevant Orders     Hemoglobin A1C     Lipid Panel     CBC Auto Differential     Comprehensive Metabolic Panel       Assessment & Plan     Assessment & Plan    Assessed chronic post-surgical head pain, noting variable intensity and location  Reviewed recent lab results, noting normal electrolytes and chemistry panel with slightly elevated triglycerides  Evaluated visual deficits, confirming left-sided visual field loss consistent with surgical site  Acknowledged ongoing recovery process, noting potential for prolonged nerve healing and stabilization post-brain surgery    FOLLOW UP:  - Teto to continue using desk calendar or start a journal to track doctor appointments and medical information.  - Follow up with either the current doctor or Dr. Lind.         Problem List Items Addressed This Visit       Bipolar 1 disorder    Chronic viral hepatitis  HEPATITIS C:  - Scheduled follow-up visit with Dr. Shilpa Manzanares (hepatology) tomorrow at 1:30 PM for hepatitis C management.      Chronic intractable headache  CHRONIC HEADACHE:  - Evaluated the patient's chronic headache condition, noting variable pain levels and nighttime exacerbations.  - Assessed the chronic nature of the pain and its impact on daily functioning.  -  "Discussed the potential long-term nature of the condition and the healing process.  - Continued current pain medication regimen, noting patient's appropriate use of "as needed" dosing.  - Prescribed Fioricet (containing acetaminophen) to be taken every 4 hours as needed for pain management.  - Advised the patient can supplement prescribed pain medication with OTC acetaminophen as needed.  - Educated the patient on the benefits of positive attitude and laughter in pain management and overall health.  - Instructed the patient to monitor pain levels, with medication reportedly reducing pain to 3 or 4 out of 10.    Follow-up exam - Primary    ELEVATED TRIGLYCERIDES:  - Evaluated recent lab results showing slightly elevated triglycerides, 9 points above the normal range.  - Assessed that the triglyceride elevation is minor and likely diet-related.  - Determined that alcohol is not a contributing factor to the elevated triglycerides.  - Explained potential causes of triglyceride elevation, including dietary factors.  - Recommend dietary modifications to address the slightly elevated triglycerides.  - Teto to consider reducing intake of sugar and fried foods to help lower triglyceride levels.    Brain tumor    VISUAL FIELD DEFECT:  - Evaluated the patient's visual field defect on the left side, particularly in the bottom left of the visual field.  - Confirmed the visual field defect on the left side, noting that the right side is unaffected.  - Assessed the visual impairment as a result of the brain surgery.  - Discussed the relationship between brain hemisphere control and contralateral body functions.  - Scheduled a follow-up visit with ophthalmology in a few months.  BRAIN TUMOR (POST-OPERATIVE):  - Acknowledged the patient's history of brain tumor surgery, which occurred on May 9th of the previous year.  - Scheduled a 2-year follow-up visit with neurosurgery in September 2026.    Need for COVID-19 vaccine    Relevant " "Medications    COVID-19 (Pfizer) 30 mcg/0.3 mL IM vaccine (>/= 13 yo) 0.3 mL (Completed)  COVID VACCINATION:  - COVID vaccine administered in office.    MEDICATIONS/SUPPLEMENTS:  - Continued multivitamin supplementation.         --------------------------------------------      Health Maintenance:  Health Maintenance         Date Due Completion Date    Colorectal Cancer Screening 02/06/2027 2/6/2024    Lipid Panel 01/24/2030 1/24/2025    TETANUS VACCINE 07/28/2031 7/28/2021    RSV Vaccine (Age 60+ and Pregnant patients) (1 - 1-dose 75+ series) 07/25/2053 ---            Health maintenance reviewed COVID vaccine today    Follow Up:  Follow up if symptoms worsen or fail to improve.    Exam     Review of Systems:  (as noted above)  Review of Systems    Physical Exam:   Physical Exam  Vitals:    01/27/25 0802   BP: 114/60   BP Location: Left arm   Patient Position: Sitting   Pulse: 97   Resp: 16   Temp: 98.8 °F (37.1 °C)   TempSrc: Oral   SpO2: 97%   Weight: 71.1 kg (156 lb 12 oz)   Height: 5' 7" (1.702 m)      Body mass index is 24.55 kg/m².    Physical Exam    General: No acute distress. Well-developed. Well-nourished.  Eyes: EOMI. Sclerae anicteric.  HENT: Normocephalic. Atraumatic. Nares patent.   Cardiovascular: Regular rate. Regular rhythm. No murmurs. No rubs. No gallops. Normal S1, S2.  Respiratory: Normal respiratory effort. Clear to auscultation bilaterally. No rales. No rhonchi. No wheezing. Lungs are clear.  Musculoskeletal: No  obvious deformity.  Extremities: No lower extremity edema.  Neurological: Alert & oriented x3. No slurred speech. Normal gait.  Psychiatric: Normal mood. Normal affect. Good insight. Good judgment.  Skin: Warm. Dry. No rash.           History     Past Medical History:  Past Medical History:   Diagnosis Date    Bipolar 1 disorder     Brain mass     Hepatitis B        Past Surgical History:  Past Surgical History:   Procedure Laterality Date    COLONOSCOPIC SURGICAL PROCEDURE      2014 "    CRANIOTOMY, WITH NEOPLASM EXCISION USING COMPUTER-ASSISTED NAVIGATION Right 5/9/2024    Procedure: CRANIOTOMY FOR BIOPSY, WITH NEOPLASM EXCISION USING COMPUTER-ASSISTED NAVIGATION- BRAIN LAB;  Surgeon: Kendall Solis MD;  Location: [facility] OR [address] [place] [address];  Service: Neurosurgery;  Laterality: Right;  CAVERNOMA EXCISION       Social History:  Social History     Socioeconomic History    Marital status: Single   Tobacco Use    Smoking status: Every Day     Types: Vaping with nicotine    Smokeless tobacco: Never   Substance and Sexual Activity    Alcohol use: Not Currently    Drug use: No    Sexual activity: Not Currently     Social Drivers of Health     Financial Resource Strain: High Risk (5/9/2024)    Overall Financial Resource Strain (CARDIA)     Difficulty of Paying Living Expenses: Hard   Food Insecurity: Food Insecurity Present (5/9/2024)    Hunger Vital Sign     Worried About Running Out of Food in the Last Year: Sometimes true     Ran Out of Food in the Last Year: Never true   Transportation Needs: No Transportation Needs (5/9/2024)    TRANSPORTATION NEEDS     Transportation : No   Physical Activity: Inactive (5/9/2024)    Exercise Vital Sign     Days of Exercise per Week: 0 days     Minutes of Exercise per Session: 0 min   Stress: Stress Concern Present (5/9/2024)    [place] [employer] [place] [employer] - Occupational Stress Questionnaire     Feeling of Stress : To some extent   Housing Stability: Low Risk  (5/9/2024)    Housing Stability Vital Sign     Unable to Pay for Housing in the Last Year: No     Homeless in the Last Year: No       Family History:  Family History   Problem Relation Name Age of Onset    Hypertension Mother      Stomach cancer Father         Allergies and Medications: (updated and reviewed)  Review of patient's allergies indicates:   Allergen Reactions    Wasp sting [allergen ext-venom-honey bee] Swelling     Current Outpatient Medications   Medication Sig Dispense Refill    acetaminophen  (TYLENOL ORAL) Take by mouth.      butalbital-acetaminophen-caffeine -40 mg (FIORICET, ESGIC) -40 mg per tablet Take 1 tablet by mouth every 4 (four) hours as needed for Pain. 90 tablet 1    multivitamin with minerals tablet Take 1 tablet by mouth once daily.       No current facility-administered medications for this visit.       Patient Care Team:  Ana Rosa Lind MD as PCP - General (Internal Medicine)         - The patient is given an After Visit Summary that lists all medications with directions, allergies, education, orders placed during this encounter and follow-up instructions.      - I have reviewed the patient's medical information including past medical, family, and social history sections including the medications and allergies.      - We discussed the patient's current medications.     This note was created by combination of typed  and MModal dictation.  Transcription errors may be present.  If there are any questions, please contact me.     This note was generated with the assistance of ambient listening technology. Verbal consent was obtained by the patient and accompanying visitor(s) for the recording of patient appointment to facilitate this note. I attest to having reviewed and edited the generated note for accuracy, though some syntax or spelling errors may persist. Please contact the author of this note for any clarification.          Radha Zhou PA-C

## 2025-01-28 ENCOUNTER — OFFICE VISIT (OUTPATIENT)
Dept: HEPATOLOGY | Facility: CLINIC | Age: 45
End: 2025-01-28
Payer: COMMERCIAL

## 2025-01-28 VITALS — HEIGHT: 67 IN | BODY MASS INDEX: 24.74 KG/M2 | WEIGHT: 157.63 LBS

## 2025-01-28 DIAGNOSIS — B18.1 CHRONIC HEPATITIS B: Primary | ICD-10-CM

## 2025-01-28 PROBLEM — Z09 FOLLOW-UP EXAM: Status: RESOLVED | Noted: 2024-10-31 | Resolved: 2025-01-28

## 2025-01-28 PROBLEM — B18.9 CHRONIC VIRAL HEPATITIS: Status: RESOLVED | Noted: 2024-02-29 | Resolved: 2025-01-28

## 2025-01-28 PROBLEM — Z23 NEED FOR COVID-19 VACCINE: Status: RESOLVED | Noted: 2025-01-27 | Resolved: 2025-01-28

## 2025-01-28 PROBLEM — Z86.19 HISTORY OF HEPATITIS B: Status: RESOLVED | Noted: 2024-01-19 | Resolved: 2025-01-28

## 2025-01-28 PROCEDURE — 1160F RVW MEDS BY RX/DR IN RCRD: CPT | Mod: CPTII,S$GLB,, | Performed by: NURSE PRACTITIONER

## 2025-01-28 PROCEDURE — 1159F MED LIST DOCD IN RCRD: CPT | Mod: CPTII,S$GLB,, | Performed by: NURSE PRACTITIONER

## 2025-01-28 PROCEDURE — 99214 OFFICE O/P EST MOD 30 MIN: CPT | Mod: S$GLB,,, | Performed by: NURSE PRACTITIONER

## 2025-01-28 PROCEDURE — 3008F BODY MASS INDEX DOCD: CPT | Mod: CPTII,S$GLB,, | Performed by: NURSE PRACTITIONER

## 2025-01-28 PROCEDURE — 3044F HG A1C LEVEL LT 7.0%: CPT | Mod: CPTII,S$GLB,, | Performed by: NURSE PRACTITIONER

## 2025-01-28 PROCEDURE — 99999 PR PBB SHADOW E&M-EST. PATIENT-LVL III: CPT | Mod: PBBFAC,,, | Performed by: NURSE PRACTITIONER

## 2025-01-28 NOTE — PROGRESS NOTES
[facility] VISIT FOLLOW UP NOTE    PCP: Ana Rosa Lind MD     CHIEF COMPLAINT:  chronic Hep B     HPI: This is a 44 y.o. patient with PMH noted below, presenting for follow up of above    Diagnosed >10 years, unsure of when. Recalls he thought he was taking medication in the past but he stopped it ?, unsure    No family members with  Hep B that he is aware    Liver fibrosis  -- fibroscan 7/2024 noted F0, S0 (kPA 4.4, )  -- fibroscan with RTC    Interval HPI: Presents today alone.   Parents were born in [place]  His mom and siblings have not been tested to his knowledge  He currently lives alone   Hep B DNA >2000 but previously <2000 on last labs  Not currently on Hep B medication     Labs done 1/2025 show normal transaminase levels, synthetic liver function  WNL    Lab Results   Component Value Date    ALT 31 01/25/2025    AST 21 01/25/2025    ALKPHOS 76 01/25/2025    BILITOT 0.3 01/25/2025    ALBUMIN 4.3 01/25/2025    INR 1.0 07/13/2024     01/24/2025       HCC screening:  Abd US no lesions, next due 7/2025  AFP WNL, next due 7/2025  Lab Results   Component Value Date    AFP <2.0 01/25/2025       Previous EGD : no indication     Denies family history of liver disease . Denies alcohol consumption -     Immunity to Hep A  - will discuss at next appt            Allergy and medication list reviewed and updated     PMHX:  has a past medical history of Bipolar 1 disorder, Brain mass, and Hepatitis B.    PSHX:  has a past surgical history that includes Colonoscopic surgical procedure and craniotomy, with neoplasm excision using computer-assisted navigation (Right, 5/9/2024).    FAMILY HISTORY: Updated and reviewed in EPIC    ROS:   GENERAL: Denies  fatigue  CARDIOVASCULAR: Denies edema  GI: Denies abdominal pain  SKIN: Denies rash, itching   NEURO: Denies confusion, memory loss, or mood changes    PHYSICAL EXAM:   In no acute distress; alert and oriented to person, place and time  VITALS: Ht 5'  "7" (1.702 m)   Wt 71.5 kg (157 lb 10.1 oz)   BMI 24.69 kg/m²   EYES: Sclerae anicteric  GI: Soft, non-tender, non-distended. No ascites.  EXTREMITIES:  No edema.  SKIN: Warm and dry. No jaundice. No telangectasias noted. No palmar erythema.  NEURO:  No asterixis.  PSYCH:  Thought and speech pattern appropriate. Behavior normal      EDUCATION:  See instructions discussed with patient in Instructions section of the After Visit Summary     ASSESSMENT & PLAN:  44 y.o. male with:  1. Chronic hepatitis B, diagnosed >10 years ago  -- Treatment: none currently indicated   -- Hep B DNA >2000, previously <2000, will monitor q6 months   -- transaminases WNL  -- synthetic liver function WNL  -- immunity to Hep A : see HPI  -- risk factors for transmission : parents born in endemic country ?  -- family members or partners that need to be tested : all siblings, mom   -- Fibroscan normal, repeat in 2026  -- HCC screening Q 6 months with AFP and abd. U/S - both next due 7/2025  -- Hep B counseling noted above discussed. Sexual partners and family members in household need to be tested and vaccinated if negative. Must use protection for sex (Hep B)   -- Labs and US q6 months, fibroscan q 2-3 years              Follow up in about 6 months (around 7/28/2025). with US and labs before, fibroscan same day  Orders Placed This Encounter   Procedures    FibroScan Transplant Hepatology(Vibration Controlled Transient Elastography)    US Abdomen Complete    Alpha-Fetoprotein and AFP L-3    Comprehensive Metabolic Panel    Hepatitis B Surface Antigen    HEPATITIS B VIRAL DNA, QUANTITATIVE        Thank you for allowing me to participate in the care of Teto Mcdermott CYDNEY Ortega    I spent a total of 30 minutes on the day of the visit.This includes face to face time and non-face to face time preparing to see the patient (eg, review of tests), obtaining and/or reviewing separately obtained history, documenting clinical " information in the electronic or other health record, independently interpreting results and communicating results to the patient/family/caregiver, and coordinating care.         CC'ed note to:

## 2025-02-20 DIAGNOSIS — G89.29 CHRONIC INTRACTABLE HEADACHE, UNSPECIFIED HEADACHE TYPE: ICD-10-CM

## 2025-02-20 DIAGNOSIS — R51.9 CHRONIC INTRACTABLE HEADACHE, UNSPECIFIED HEADACHE TYPE: ICD-10-CM

## 2025-02-20 NOTE — TELEPHONE ENCOUNTER
No care due was identified.  Health [facility] Embedded Care Due Messages. Reference number: [id number].   2/20/2025 6:48:45 AM CST

## 2025-02-21 RX ORDER — BUTALBITAL, ACETAMINOPHEN AND CAFFEINE 50; 325; 40 MG/1; MG/1; MG/1
1 TABLET ORAL EVERY 12 HOURS PRN
Qty: 60 TABLET | Refills: 0 | Status: SHIPPED | OUTPATIENT
Start: 2025-02-21 | End: 2025-03-23

## 2025-05-28 RX ORDER — BUTALBITAL, ACETAMINOPHEN AND CAFFEINE 300; 40; 50 MG/1; MG/1; MG/1
1 CAPSULE ORAL DAILY PRN
Qty: 60 CAPSULE | Refills: 0 | Status: SHIPPED | OUTPATIENT
Start: 2025-05-28

## 2025-05-28 NOTE — TELEPHONE ENCOUNTER
No care due was identified.  Health [facility] Embedded Care Due Messages. Reference number: [id number].   5/28/2025 10:11:39 AM CDT

## 2025-06-02 RX ORDER — BUTALBITAL, ACETAMINOPHEN AND CAFFEINE 300; 40; 50 MG/1; MG/1; MG/1
1 CAPSULE ORAL DAILY PRN
Qty: 60 CAPSULE | Refills: 0 | Status: CANCELLED | OUTPATIENT
Start: 2025-06-02

## 2025-08-11 RX ORDER — BUTALBITAL, ACETAMINOPHEN AND CAFFEINE 300; 40; 50 MG/1; MG/1; MG/1
1 CAPSULE ORAL DAILY PRN
Qty: 60 CAPSULE | Refills: 0 | Status: SHIPPED | OUTPATIENT
Start: 2025-08-11

## 2025-08-11 RX ORDER — BUTALBITAL, ACETAMINOPHEN AND CAFFEINE 300; 40; 50 MG/1; MG/1; MG/1
1 CAPSULE ORAL DAILY PRN
Qty: 60 CAPSULE | Refills: 0 | Status: SHIPPED | OUTPATIENT
Start: 2025-08-11 | End: 2025-08-11 | Stop reason: SDUPTHER

## 2025-08-20 ENCOUNTER — TELEPHONE (OUTPATIENT)
Dept: FAMILY MEDICINE | Facility: CLINIC | Age: 45
End: 2025-08-20
Payer: COMMERCIAL

## (undated) DEVICE — RUBBERBAND STERILE 3X1/8IN

## (undated) DEVICE — DRAPE INCISE IOBAN 2 23X17IN

## (undated) DEVICE — ROUTER TAPERED 2.3MM

## (undated) DEVICE — PAD CURAD NONADH 3X4IN

## (undated) DEVICE — BLADE SURG CARBON STEEL SZ11

## (undated) DEVICE — KIT SURGIFLO HEMOSTATIC MATRIX

## (undated) DEVICE — STAPLER SKIN PROXIMATE WIDE

## (undated) DEVICE — MARKERS SPHERZ PASSIVE

## (undated) DEVICE — DRAPE STERI INSTRUMENT 1018

## (undated) DEVICE — SYR 3CC LUER LOC

## (undated) DEVICE — CONTAINER SPECIMEN OR STER 4OZ

## (undated) DEVICE — DRAPE CRANIOTOMY T SURG STRL

## (undated) DEVICE — SUT MCRYL PLUS 4-0 PS2 27IN

## (undated) DEVICE — SUT 4-0 VICRYL / SH

## (undated) DEVICE — PINS SKULL ADULT MAYFIELD
Type: IMPLANTABLE DEVICE | Site: CRANIAL | Status: NON-FUNCTIONAL
Removed: 2024-05-09

## (undated) DEVICE — SUT VICRYL PLUS 3-0 SH 18IN

## (undated) DEVICE — Device

## (undated) DEVICE — BUR BONE CUT MICRO TPS 3X3.8MM

## (undated) DEVICE — SYR 10CC LUER LOCK

## (undated) DEVICE — TRAY NEURO OMC

## (undated) DEVICE — DRESSING SURGICAL 1/2X1/2

## (undated) DEVICE — DRAPE STERI-DRAPE 1000 17X11IN

## (undated) DEVICE — SKIN MARKERS DEROYAL

## (undated) DEVICE — TUBE FRAZIER 5MM 2FT SOFT TIP

## (undated) DEVICE — CORD BIPOLAR 12 FOOT

## (undated) DEVICE — NDL N SERIES MICRO-DISSECTION